# Patient Record
Sex: MALE | Race: WHITE | Employment: FULL TIME | URBAN - METROPOLITAN AREA
[De-identification: names, ages, dates, MRNs, and addresses within clinical notes are randomized per-mention and may not be internally consistent; named-entity substitution may affect disease eponyms.]

---

## 2022-11-11 ENCOUNTER — APPOINTMENT (OUTPATIENT)
Dept: GENERAL RADIOLOGY | Age: 67
DRG: 201 | End: 2022-11-11
Payer: COMMERCIAL

## 2022-11-11 ENCOUNTER — HOSPITAL ENCOUNTER (INPATIENT)
Age: 67
LOS: 1 days | Discharge: ANOTHER HOSPITAL | DRG: 201 | End: 2022-11-11
Attending: EMERGENCY MEDICINE | Admitting: INTERNAL MEDICINE
Payer: COMMERCIAL

## 2022-11-11 ENCOUNTER — APPOINTMENT (OUTPATIENT)
Dept: CT IMAGING | Age: 67
DRG: 201 | End: 2022-11-11
Payer: COMMERCIAL

## 2022-11-11 ENCOUNTER — HOSPITAL ENCOUNTER (INPATIENT)
Age: 67
LOS: 6 days | Discharge: HOME | DRG: 179 | End: 2022-11-17
Attending: INTERNAL MEDICINE | Admitting: INTERNAL MEDICINE
Payer: COMMERCIAL

## 2022-11-11 VITALS
BODY MASS INDEX: 25.2 KG/M2 | HEIGHT: 71 IN | SYSTOLIC BLOOD PRESSURE: 125 MMHG | WEIGHT: 180 LBS | HEART RATE: 72 BPM | DIASTOLIC BLOOD PRESSURE: 86 MMHG | OXYGEN SATURATION: 98 % | RESPIRATION RATE: 18 BRPM | TEMPERATURE: 98.4 F

## 2022-11-11 DIAGNOSIS — R42 DIZZINESS: ICD-10-CM

## 2022-11-11 DIAGNOSIS — I47.20 VENTRICULAR TACHYCARDIA: Primary | ICD-10-CM

## 2022-11-11 PROBLEM — R74.01 ELEVATED TRANSAMINASE LEVEL: Status: ACTIVE | Noted: 2022-11-11

## 2022-11-11 PROBLEM — I25.10 CAD IN NATIVE ARTERY: Status: ACTIVE | Noted: 2022-11-11

## 2022-11-11 PROBLEM — I42.8 NONISCHEMIC CARDIOMYOPATHY (HCC): Status: ACTIVE | Noted: 2022-11-11

## 2022-11-11 LAB
ABO/RH: NORMAL
ALBUMIN SERPL-MCNC: 3.7 G/DL (ref 3.5–5.2)
ALBUMIN SERPL-MCNC: 3.8 G/DL (ref 3.5–5.2)
ALP BLD-CCNC: 103 U/L (ref 40–129)
ALP BLD-CCNC: 95 U/L (ref 40–129)
ALT SERPL-CCNC: 330 U/L (ref 0–40)
ALT SERPL-CCNC: 352 U/L (ref 0–40)
ANION GAP SERPL CALCULATED.3IONS-SCNC: 11 MMOL/L (ref 7–16)
ANION GAP SERPL CALCULATED.3IONS-SCNC: 13 MMOL/L (ref 7–16)
ANTIBODY SCREEN: NORMAL
AST SERPL-CCNC: 309 U/L (ref 0–39)
AST SERPL-CCNC: 350 U/L (ref 0–39)
BILIRUB SERPL-MCNC: 0.4 MG/DL (ref 0–1.2)
BILIRUB SERPL-MCNC: 0.6 MG/DL (ref 0–1.2)
BUN BLDV-MCNC: 24 MG/DL (ref 6–23)
BUN BLDV-MCNC: 25 MG/DL (ref 6–23)
CALCIUM SERPL-MCNC: 8.2 MG/DL (ref 8.6–10.2)
CALCIUM SERPL-MCNC: 8.5 MG/DL (ref 8.6–10.2)
CHLORIDE BLD-SCNC: 103 MMOL/L (ref 98–107)
CHLORIDE BLD-SCNC: 107 MMOL/L (ref 98–107)
CO2: 22 MMOL/L (ref 22–29)
CO2: 22 MMOL/L (ref 22–29)
CREAT SERPL-MCNC: 0.9 MG/DL (ref 0.7–1.2)
CREAT SERPL-MCNC: 1.1 MG/DL (ref 0.7–1.2)
EKG ATRIAL RATE: 85 BPM
EKG P AXIS: 67 DEGREES
EKG P-R INTERVAL: 136 MS
EKG Q-T INTERVAL: 380 MS
EKG QRS DURATION: 88 MS
EKG QTC CALCULATION (BAZETT): 452 MS
EKG R AXIS: 5 DEGREES
EKG T AXIS: 64 DEGREES
EKG VENTRICULAR RATE: 85 BPM
GFR SERPL CREATININE-BSD FRML MDRD: >60 ML/MIN/1.73
GFR SERPL CREATININE-BSD FRML MDRD: >60 ML/MIN/1.73
GLUCOSE BLD-MCNC: 175 MG/DL (ref 74–99)
GLUCOSE BLD-MCNC: 234 MG/DL (ref 74–99)
HBA1C MFR BLD: 5.7 % (ref 4–5.6)
HCT VFR BLD CALC: 44.8 % (ref 37–54)
HEMOGLOBIN: 15 G/DL (ref 12.5–16.5)
INR BLD: 1.1
LACTIC ACID: 1.9 MMOL/L (ref 0.5–2.2)
LACTIC ACID: 2.7 MMOL/L (ref 0.5–2.2)
LACTIC ACID: 3 MMOL/L (ref 0.5–2.2)
LIPASE: 39 U/L (ref 13–60)
MAGNESIUM: 2 MG/DL (ref 1.6–2.6)
MAGNESIUM: 2 MG/DL (ref 1.6–2.6)
MCH RBC QN AUTO: 29.2 PG (ref 26–35)
MCHC RBC AUTO-ENTMCNC: 33.5 % (ref 32–34.5)
MCV RBC AUTO: 87.3 FL (ref 80–99.9)
PDW BLD-RTO: 13.2 FL (ref 11.5–15)
PLATELET # BLD: 142 E9/L (ref 130–450)
PMV BLD AUTO: 12.3 FL (ref 7–12)
POTASSIUM SERPL-SCNC: 3.2 MMOL/L (ref 3.5–5)
POTASSIUM SERPL-SCNC: 3.6 MMOL/L (ref 3.5–5)
POTASSIUM SERPL-SCNC: 3.7 MMOL/L (ref 3.5–5)
PROTHROMBIN TIME: 12.2 SEC (ref 9.3–12.4)
RBC # BLD: 5.13 E12/L (ref 3.8–5.8)
SODIUM BLD-SCNC: 138 MMOL/L (ref 132–146)
SODIUM BLD-SCNC: 140 MMOL/L (ref 132–146)
T4 FREE: 1.17 NG/DL (ref 0.93–1.7)
TOTAL PROTEIN: 5.7 G/DL (ref 6.4–8.3)
TOTAL PROTEIN: 6.2 G/DL (ref 6.4–8.3)
TROPONIN, HIGH SENSITIVITY: 88 NG/L (ref 0–11)
TROPONIN, HIGH SENSITIVITY: 93 NG/L (ref 0–11)
TROPONIN, HIGH SENSITIVITY: 99 NG/L (ref 0–11)
TSH SERPL DL<=0.05 MIU/L-ACNC: 1.26 UIU/ML (ref 0.27–4.2)
WBC # BLD: 6.9 E9/L (ref 4.5–11.5)

## 2022-11-11 PROCEDURE — 93458 L HRT ARTERY/VENTRICLE ANGIO: CPT | Performed by: INTERNAL MEDICINE

## 2022-11-11 PROCEDURE — 93010 ELECTROCARDIOGRAM REPORT: CPT | Performed by: INTERNAL MEDICINE

## 2022-11-11 PROCEDURE — 99223 1ST HOSP IP/OBS HIGH 75: CPT | Performed by: INTERNAL MEDICINE

## 2022-11-11 PROCEDURE — 85027 COMPLETE CBC AUTOMATED: CPT

## 2022-11-11 PROCEDURE — 36415 COLL VENOUS BLD VENIPUNCTURE: CPT

## 2022-11-11 PROCEDURE — 85610 PROTHROMBIN TIME: CPT

## 2022-11-11 PROCEDURE — 6360000002 HC RX W HCPCS

## 2022-11-11 PROCEDURE — APPSS60 APP SPLIT SHARED TIME 46-60 MINUTES: Performed by: CLINICAL NURSE SPECIALIST

## 2022-11-11 PROCEDURE — 84484 ASSAY OF TROPONIN QUANT: CPT

## 2022-11-11 PROCEDURE — 2580000003 HC RX 258

## 2022-11-11 PROCEDURE — 96365 THER/PROPH/DIAG IV INF INIT: CPT

## 2022-11-11 PROCEDURE — C1769 GUIDE WIRE: HCPCS

## 2022-11-11 PROCEDURE — 93005 ELECTROCARDIOGRAM TRACING: CPT | Performed by: INTERNAL MEDICINE

## 2022-11-11 PROCEDURE — 1200000000 HC SEMI PRIVATE

## 2022-11-11 PROCEDURE — 6360000002 HC RX W HCPCS: Performed by: EMERGENCY MEDICINE

## 2022-11-11 PROCEDURE — 83735 ASSAY OF MAGNESIUM: CPT

## 2022-11-11 PROCEDURE — 93458 L HRT ARTERY/VENTRICLE ANGIO: CPT

## 2022-11-11 PROCEDURE — 84443 ASSAY THYROID STIM HORMONE: CPT

## 2022-11-11 PROCEDURE — 93005 ELECTROCARDIOGRAM TRACING: CPT | Performed by: STUDENT IN AN ORGANIZED HEALTH CARE EDUCATION/TRAINING PROGRAM

## 2022-11-11 PROCEDURE — 84439 ASSAY OF FREE THYROXINE: CPT

## 2022-11-11 PROCEDURE — 80074 ACUTE HEPATITIS PANEL: CPT

## 2022-11-11 PROCEDURE — 86901 BLOOD TYPING SEROLOGIC RH(D): CPT

## 2022-11-11 PROCEDURE — 2140000000 HC CCU INTERMEDIATE R&B

## 2022-11-11 PROCEDURE — 83690 ASSAY OF LIPASE: CPT

## 2022-11-11 PROCEDURE — 6370000000 HC RX 637 (ALT 250 FOR IP): Performed by: INTERNAL MEDICINE

## 2022-11-11 PROCEDURE — 99254 IP/OBS CNSLTJ NEW/EST MOD 60: CPT | Performed by: INTERNAL MEDICINE

## 2022-11-11 PROCEDURE — 71045 X-RAY EXAM CHEST 1 VIEW: CPT

## 2022-11-11 PROCEDURE — 6360000002 HC RX W HCPCS: Performed by: NURSE PRACTITIONER

## 2022-11-11 PROCEDURE — 84132 ASSAY OF SERUM POTASSIUM: CPT

## 2022-11-11 PROCEDURE — 86850 RBC ANTIBODY SCREEN: CPT

## 2022-11-11 PROCEDURE — 6360000002 HC RX W HCPCS: Performed by: STUDENT IN AN ORGANIZED HEALTH CARE EDUCATION/TRAINING PROGRAM

## 2022-11-11 PROCEDURE — C1894 INTRO/SHEATH, NON-LASER: HCPCS

## 2022-11-11 PROCEDURE — 83036 HEMOGLOBIN GLYCOSYLATED A1C: CPT

## 2022-11-11 PROCEDURE — 83605 ASSAY OF LACTIC ACID: CPT

## 2022-11-11 PROCEDURE — 2500000003 HC RX 250 WO HCPCS

## 2022-11-11 PROCEDURE — B2111ZZ FLUOROSCOPY OF MULTIPLE CORONARY ARTERIES USING LOW OSMOLAR CONTRAST: ICD-10-PCS | Performed by: INTERNAL MEDICINE

## 2022-11-11 PROCEDURE — 2709999900 HC NON-CHARGEABLE SUPPLY

## 2022-11-11 PROCEDURE — B2151ZZ FLUOROSCOPY OF LEFT HEART USING LOW OSMOLAR CONTRAST: ICD-10-PCS | Performed by: INTERNAL MEDICINE

## 2022-11-11 PROCEDURE — 6360000002 HC RX W HCPCS: Performed by: INTERNAL MEDICINE

## 2022-11-11 PROCEDURE — 99285 EMERGENCY DEPT VISIT HI MDM: CPT

## 2022-11-11 PROCEDURE — 4A023N7 MEASUREMENT OF CARDIAC SAMPLING AND PRESSURE, LEFT HEART, PERCUTANEOUS APPROACH: ICD-10-PCS | Performed by: INTERNAL MEDICINE

## 2022-11-11 PROCEDURE — 70450 CT HEAD/BRAIN W/O DYE: CPT

## 2022-11-11 PROCEDURE — 80053 COMPREHEN METABOLIC PANEL: CPT

## 2022-11-11 PROCEDURE — 86900 BLOOD TYPING SEROLOGIC ABO: CPT

## 2022-11-11 PROCEDURE — 96368 THER/DIAG CONCURRENT INF: CPT

## 2022-11-11 PROCEDURE — 2580000003 HC RX 258: Performed by: INTERNAL MEDICINE

## 2022-11-11 PROCEDURE — 6370000000 HC RX 637 (ALT 250 FOR IP): Performed by: CLINICAL NURSE SPECIALIST

## 2022-11-11 RX ORDER — SODIUM CHLORIDE 0.9 % (FLUSH) 0.9 %
5-40 SYRINGE (ML) INJECTION EVERY 12 HOURS SCHEDULED
OUTPATIENT
Start: 2022-11-11

## 2022-11-11 RX ORDER — MAGNESIUM SULFATE IN WATER 40 MG/ML
2000 INJECTION, SOLUTION INTRAVENOUS ONCE
Status: COMPLETED | OUTPATIENT
Start: 2022-11-11 | End: 2022-11-11

## 2022-11-11 RX ORDER — SODIUM CHLORIDE 0.9 % (FLUSH) 0.9 %
5-40 SYRINGE (ML) INJECTION PRN
Status: DISCONTINUED | OUTPATIENT
Start: 2022-11-11 | End: 2022-11-11 | Stop reason: HOSPADM

## 2022-11-11 RX ORDER — LIDOCAINE HYDROCHLORIDE ANHYDROUS AND DEXTROSE MONOHYDRATE .4; 5 G/100ML; G/100ML
2 INJECTION, SOLUTION INTRAVENOUS CONTINUOUS
Status: DISCONTINUED | OUTPATIENT
Start: 2022-11-11 | End: 2022-11-11 | Stop reason: HOSPADM

## 2022-11-11 RX ORDER — SODIUM CHLORIDE 0.9 % (FLUSH) 0.9 %
5-40 SYRINGE (ML) INJECTION PRN
Status: DISCONTINUED | OUTPATIENT
Start: 2022-11-11 | End: 2022-11-17 | Stop reason: HOSPADM

## 2022-11-11 RX ORDER — ACETAMINOPHEN 650 MG/1
650 SUPPOSITORY RECTAL EVERY 6 HOURS PRN
OUTPATIENT
Start: 2022-11-11

## 2022-11-11 RX ORDER — METOPROLOL SUCCINATE 25 MG/1
25 TABLET, EXTENDED RELEASE ORAL 2 TIMES DAILY
Status: DISCONTINUED | OUTPATIENT
Start: 2022-11-11 | End: 2022-11-11 | Stop reason: HOSPADM

## 2022-11-11 RX ORDER — ACETAMINOPHEN 325 MG/1
650 TABLET ORAL EVERY 4 HOURS PRN
Status: DISCONTINUED | OUTPATIENT
Start: 2022-11-11 | End: 2022-11-15 | Stop reason: SDUPTHER

## 2022-11-11 RX ORDER — NITROGLYCERIN 20 MG/100ML
5-200 INJECTION INTRAVENOUS CONTINUOUS
Status: DISCONTINUED | OUTPATIENT
Start: 2022-11-11 | End: 2022-11-11 | Stop reason: HOSPADM

## 2022-11-11 RX ORDER — LIDOCAINE HYDROCHLORIDE ANHYDROUS AND DEXTROSE MONOHYDRATE .4; 5 G/100ML; G/100ML
1 INJECTION, SOLUTION INTRAVENOUS CONTINUOUS
Status: DISPENSED | OUTPATIENT
Start: 2022-11-11 | End: 2022-11-13

## 2022-11-11 RX ORDER — SODIUM CHLORIDE 9 MG/ML
INJECTION, SOLUTION INTRAVENOUS PRN
Status: DISCONTINUED | OUTPATIENT
Start: 2022-11-11 | End: 2022-11-17 | Stop reason: HOSPADM

## 2022-11-11 RX ORDER — ENOXAPARIN SODIUM 100 MG/ML
40 INJECTION SUBCUTANEOUS DAILY
Status: DISCONTINUED | OUTPATIENT
Start: 2022-11-11 | End: 2022-11-11 | Stop reason: HOSPADM

## 2022-11-11 RX ORDER — ASPIRIN 81 MG/1
81 TABLET, CHEWABLE ORAL DAILY
Status: DISCONTINUED | OUTPATIENT
Start: 2022-11-12 | End: 2022-11-17 | Stop reason: HOSPADM

## 2022-11-11 RX ORDER — POLYETHYLENE GLYCOL 3350 17 G/17G
17 POWDER, FOR SOLUTION ORAL DAILY PRN
Status: DISCONTINUED | OUTPATIENT
Start: 2022-11-11 | End: 2022-11-11 | Stop reason: HOSPADM

## 2022-11-11 RX ORDER — POLYETHYLENE GLYCOL 3350 17 G/17G
17 POWDER, FOR SOLUTION ORAL DAILY PRN
OUTPATIENT
Start: 2022-11-11

## 2022-11-11 RX ORDER — ATORVASTATIN CALCIUM 40 MG/1
40 TABLET, FILM COATED ORAL NIGHTLY
Status: DISCONTINUED | OUTPATIENT
Start: 2022-11-11 | End: 2022-11-11 | Stop reason: HOSPADM

## 2022-11-11 RX ORDER — SODIUM CHLORIDE 0.9 % (FLUSH) 0.9 %
5-40 SYRINGE (ML) INJECTION EVERY 12 HOURS SCHEDULED
Status: DISCONTINUED | OUTPATIENT
Start: 2022-11-11 | End: 2022-11-11 | Stop reason: HOSPADM

## 2022-11-11 RX ORDER — ENOXAPARIN SODIUM 100 MG/ML
40 INJECTION SUBCUTANEOUS DAILY
Status: DISCONTINUED | OUTPATIENT
Start: 2022-11-12 | End: 2022-11-14

## 2022-11-11 RX ORDER — NITROGLYCERIN 20 MG/100ML
5-200 INJECTION INTRAVENOUS CONTINUOUS
OUTPATIENT
Start: 2022-11-11

## 2022-11-11 RX ORDER — ONDANSETRON 2 MG/ML
4 INJECTION INTRAMUSCULAR; INTRAVENOUS EVERY 6 HOURS PRN
OUTPATIENT
Start: 2022-11-11

## 2022-11-11 RX ORDER — ACETAMINOPHEN 325 MG/1
650 TABLET ORAL EVERY 6 HOURS PRN
Status: DISCONTINUED | OUTPATIENT
Start: 2022-11-11 | End: 2022-11-11 | Stop reason: HOSPADM

## 2022-11-11 RX ORDER — ASPIRIN 81 MG/1
81 TABLET, CHEWABLE ORAL DAILY
Qty: 30 TABLET | Refills: 3
Start: 2022-11-12

## 2022-11-11 RX ORDER — METOPROLOL SUCCINATE 25 MG/1
25 TABLET, EXTENDED RELEASE ORAL 2 TIMES DAILY
Status: DISCONTINUED | OUTPATIENT
Start: 2022-11-11 | End: 2022-11-17 | Stop reason: HOSPADM

## 2022-11-11 RX ORDER — SODIUM CHLORIDE 0.9 % (FLUSH) 0.9 %
5-40 SYRINGE (ML) INJECTION EVERY 12 HOURS SCHEDULED
Status: DISCONTINUED | OUTPATIENT
Start: 2022-11-11 | End: 2022-11-17 | Stop reason: HOSPADM

## 2022-11-11 RX ORDER — ASPIRIN 81 MG/1
243 TABLET, CHEWABLE ORAL ONCE
Status: COMPLETED | OUTPATIENT
Start: 2022-11-11 | End: 2022-11-11

## 2022-11-11 RX ORDER — SODIUM CHLORIDE 0.9 % (FLUSH) 0.9 %
5-40 SYRINGE (ML) INJECTION PRN
OUTPATIENT
Start: 2022-11-11

## 2022-11-11 RX ORDER — ONDANSETRON 4 MG/1
4 TABLET, ORALLY DISINTEGRATING ORAL EVERY 8 HOURS PRN
Status: DISCONTINUED | OUTPATIENT
Start: 2022-11-11 | End: 2022-11-11 | Stop reason: HOSPADM

## 2022-11-11 RX ORDER — ACETAMINOPHEN 325 MG/1
650 TABLET ORAL EVERY 6 HOURS PRN
OUTPATIENT
Start: 2022-11-11

## 2022-11-11 RX ORDER — SODIUM CHLORIDE 9 MG/ML
INJECTION, SOLUTION INTRAVENOUS PRN
OUTPATIENT
Start: 2022-11-11

## 2022-11-11 RX ORDER — POTASSIUM CHLORIDE 20 MEQ/1
40 TABLET, EXTENDED RELEASE ORAL 2 TIMES DAILY WITH MEALS
Status: DISCONTINUED | OUTPATIENT
Start: 2022-11-11 | End: 2022-11-11 | Stop reason: HOSPADM

## 2022-11-11 RX ORDER — LIDOCAINE HYDROCHLORIDE ANHYDROUS AND DEXTROSE MONOHYDRATE .4; 5 G/100ML; G/100ML
2 INJECTION, SOLUTION INTRAVENOUS CONTINUOUS
OUTPATIENT
Start: 2022-11-11

## 2022-11-11 RX ORDER — ACETAMINOPHEN 650 MG/1
650 SUPPOSITORY RECTAL EVERY 6 HOURS PRN
Status: DISCONTINUED | OUTPATIENT
Start: 2022-11-11 | End: 2022-11-11 | Stop reason: HOSPADM

## 2022-11-11 RX ORDER — METOPROLOL SUCCINATE 25 MG/1
25 TABLET, EXTENDED RELEASE ORAL 2 TIMES DAILY
OUTPATIENT
Start: 2022-11-11

## 2022-11-11 RX ORDER — ASPIRIN 81 MG/1
81 TABLET, CHEWABLE ORAL DAILY
OUTPATIENT
Start: 2022-11-12

## 2022-11-11 RX ORDER — SODIUM CHLORIDE 9 MG/ML
INJECTION, SOLUTION INTRAVENOUS PRN
Status: DISCONTINUED | OUTPATIENT
Start: 2022-11-11 | End: 2022-11-11 | Stop reason: HOSPADM

## 2022-11-11 RX ORDER — METOPROLOL SUCCINATE 25 MG/1
25 TABLET, EXTENDED RELEASE ORAL 2 TIMES DAILY
Qty: 30 TABLET | Refills: 3 | Status: ON HOLD
Start: 2022-11-11 | End: 2022-11-17 | Stop reason: SDUPTHER

## 2022-11-11 RX ORDER — POTASSIUM CHLORIDE 20 MEQ/1
40 TABLET, EXTENDED RELEASE ORAL ONCE
Status: DISCONTINUED | OUTPATIENT
Start: 2022-11-11 | End: 2022-11-11

## 2022-11-11 RX ORDER — ONDANSETRON 2 MG/ML
4 INJECTION INTRAMUSCULAR; INTRAVENOUS EVERY 6 HOURS PRN
Status: DISCONTINUED | OUTPATIENT
Start: 2022-11-11 | End: 2022-11-11 | Stop reason: HOSPADM

## 2022-11-11 RX ORDER — ONDANSETRON 4 MG/1
4 TABLET, ORALLY DISINTEGRATING ORAL EVERY 8 HOURS PRN
OUTPATIENT
Start: 2022-11-11

## 2022-11-11 RX ORDER — POTASSIUM CHLORIDE 20 MEQ/1
40 TABLET, EXTENDED RELEASE ORAL 2 TIMES DAILY WITH MEALS
OUTPATIENT
Start: 2022-11-11

## 2022-11-11 RX ORDER — ASPIRIN 81 MG/1
81 TABLET, CHEWABLE ORAL DAILY
Status: DISCONTINUED | OUTPATIENT
Start: 2022-11-11 | End: 2022-11-11 | Stop reason: HOSPADM

## 2022-11-11 RX ADMIN — LIDOCAINE HYDROCHLORIDE 2 MG/MIN: 4 INJECTION, SOLUTION INTRAVENOUS at 22:32

## 2022-11-11 RX ADMIN — MAGNESIUM SULFATE HEPTAHYDRATE 2000 MG: 40 INJECTION, SOLUTION INTRAVENOUS at 05:07

## 2022-11-11 RX ADMIN — LIDOCAINE HYDROCHLORIDE 2 MG/MIN: 4 INJECTION, SOLUTION INTRAVENOUS at 04:48

## 2022-11-11 RX ADMIN — ASPIRIN 81 MG CHEWABLE TABLET 81 MG: 81 TABLET CHEWABLE at 08:29

## 2022-11-11 RX ADMIN — METOPROLOL SUCCINATE 25 MG: 25 TABLET, FILM COATED, EXTENDED RELEASE ORAL at 22:30

## 2022-11-11 RX ADMIN — POTASSIUM CHLORIDE 40 MEQ: 1500 TABLET, EXTENDED RELEASE ORAL at 08:29

## 2022-11-11 RX ADMIN — SODIUM CHLORIDE, PRESERVATIVE FREE 10 ML: 5 INJECTION INTRAVENOUS at 22:30

## 2022-11-11 RX ADMIN — ENOXAPARIN SODIUM 40 MG: 100 INJECTION SUBCUTANEOUS at 10:36

## 2022-11-11 RX ADMIN — ASPIRIN 243 MG: 81 TABLET, CHEWABLE ORAL at 13:27

## 2022-11-11 RX ADMIN — SACUBITRIL AND VALSARTAN 1 TABLET: 24; 26 TABLET, FILM COATED ORAL at 22:30

## 2022-11-11 ASSESSMENT — ENCOUNTER SYMPTOMS
SHORTNESS OF BREATH: 0
VOMITING: 0
EYE REDNESS: 0
NAUSEA: 1
ABDOMINAL PAIN: 0

## 2022-11-11 ASSESSMENT — PAIN SCALES - GENERAL
PAINLEVEL_OUTOF10: 0
PAINLEVEL_OUTOF10: 2

## 2022-11-11 ASSESSMENT — PAIN - FUNCTIONAL ASSESSMENT
PAIN_FUNCTIONAL_ASSESSMENT: NONE - DENIES PAIN
PAIN_FUNCTIONAL_ASSESSMENT: NONE - DENIES PAIN
PAIN_FUNCTIONAL_ASSESSMENT: 0-10
PAIN_FUNCTIONAL_ASSESSMENT: NONE - DENIES PAIN
PAIN_FUNCTIONAL_ASSESSMENT: NONE - DENIES PAIN

## 2022-11-11 ASSESSMENT — PAIN DESCRIPTION - LOCATION: LOCATION: ABDOMEN

## 2022-11-11 ASSESSMENT — PAIN DESCRIPTION - ORIENTATION: ORIENTATION: MID;UPPER

## 2022-11-11 NOTE — ED PROVIDER NOTES
Chief complaint: Dizziness and fatigue      HPI:  11/11/22, Time: 5:06 AM EST    SERENITY Alvarez is a 78 y.o. male presenting to the ED for dizziness. The history is obtained from the patient as well as patient's medical record. Immediately upon arrival the patient was noted to be in V. tach. The patient was treated and cardioverted out of V. tach and then for history is obtained secondary to the acuity of the patient's condition. The patient states that he is a  he is from [facility]. 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 Dizziness was moderate in severity. Described as lightheaded. Nothing made it better. No treatment prior to arrival.  The patient has no prior cardiac history. He did complain of a epigastric abdominal pain described as aching. Mild in severity. This was improved after the cardioversion and termination of the V. tach. The patient states that he does feel confused although he is oriented to person, place and time    ROS:   Review of Systems   Constitutional:  Negative for chills and fever. HENT:  Negative for congestion. Eyes:  Negative for redness. Respiratory:  Negative for shortness of breath. Cardiovascular:  Negative for chest pain. Gastrointestinal:  Positive for nausea. Negative for abdominal pain and vomiting. Genitourinary:  Negative for dysuria. Musculoskeletal:  Negative for arthralgias. Skin:  Negative for rash. Neurological:  Positive for dizziness and light-headedness. Psychiatric/Behavioral:  Positive for confusion. All other systems reviewed and are negative.    --------------------------------------------- PAST HISTORY ---------------------------------------------  Past Medical History:  has no past medical history on file. Past Surgical History:  has no past surgical history on file. Social History:  reports that he has quit smoking.  His smoking use included cigarettes. He does not have any smokeless tobacco history on file. Family History: family history is not on file. The patients home medications have been reviewed. Allergies: Patient has no known allergies. ---------------------------------------------------PHYSICAL EXAM--------------------------------------        Constitutional/General: Alert and oriented x3, well appearing, non toxic in NAD  Head: Normocephalic and atraumatic  Mouth: Oropharynx clear, handling secretions, no trismus  Neck: Supple, full ROM,  Pulmonary: Lungs clear to auscultation bilaterally, no wheezes, rales, or rhonchi. Not in respiratory distress  Cardiovascular: Tachycardic rate. Regular rhythm. No murmurs  Chest: no chest wall tenderness  Abdomen: Soft. Non tender. Non distended. No rebound, guarding, or rigidity. No pulsatile masses appreciated. Musculoskeletal: Moves all extremities x 4. Warm and well perfused, no clubbing, cyanosis, or edema. Capillary refill <3 seconds  Skin: warm and dry. No rashes. Neurologic: GCS 15, no gross focal neurologic deficits  Psych: Normal Affect    -------------------------------------------------- RESULTS -------------------------------------------------  I have personally reviewed all laboratory and imaging results for this patient. Results are listed below.      LABS:  Results for orders placed or performed during the hospital encounter of 11/11/22   CBC   Result Value Ref Range    WBC 6.9 4.5 - 11.5 E9/L    RBC 5.13 3.80 - 5.80 E12/L    Hemoglobin 15.0 12.5 - 16.5 g/dL    Hematocrit 44.8 37.0 - 54.0 %    MCV 87.3 80.0 - 99.9 fL    MCH 29.2 26.0 - 35.0 pg    MCHC 33.5 32.0 - 34.5 %    RDW 13.2 11.5 - 15.0 fL    Platelets 165 899 - 169 E9/L    MPV 12.3 (H) 7.0 - 12.0 fL   Comprehensive Metabolic Panel   Result Value Ref Range    Sodium 138 132 - 146 mmol/L    Potassium 3.7 3.5 - 5.0 mmol/L    Chloride 103 98 - 107 mmol/L    CO2 22 22 - 29 mmol/L    Anion Gap 13 7 - 16 mmol/L Glucose 234 (H) 74 - 99 mg/dL    BUN 25 (H) 6 - 23 mg/dL    Creatinine 1.1 0.7 - 1.2 mg/dL    Est, Glom Filt Rate >60 >=60 mL/min/1.73    Calcium 8.5 (L) 8.6 - 10.2 mg/dL    Total Protein 6.2 (L) 6.4 - 8.3 g/dL    Albumin 3.8 3.5 - 5.2 g/dL    Total Bilirubin 0.6 0.0 - 1.2 mg/dL    Alkaline Phosphatase 103 40 - 129 U/L     (H) 0 - 40 U/L     (H) 0 - 39 U/L   Troponin   Result Value Ref Range    Troponin, High Sensitivity 93 (H) 0 - 11 ng/L   Troponin   Result Value Ref Range    Troponin, High Sensitivity 88 (H) 0 - 11 ng/L   Magnesium   Result Value Ref Range    Magnesium 2.0 1.6 - 2.6 mg/dL   Lactic Acid   Result Value Ref Range    Lactic Acid 3.0 (H) 0.5 - 2.2 mmol/L   Lipase   Result Value Ref Range    Lipase 39 13 - 60 U/L   Protime-INR   Result Value Ref Range    Protime 12.2 9.3 - 12.4 sec    INR 1.1    Hemoglobin A1C   Result Value Ref Range    Hemoglobin A1C 5.7 (H) 4.0 - 5.6 %   Comprehensive Metabolic Panel   Result Value Ref Range    Sodium 140 132 - 146 mmol/L    Potassium 3.2 (L) 3.5 - 5.0 mmol/L    Chloride 107 98 - 107 mmol/L    CO2 22 22 - 29 mmol/L    Anion Gap 11 7 - 16 mmol/L    Glucose 175 (H) 74 - 99 mg/dL    BUN 24 (H) 6 - 23 mg/dL    Creatinine 0.9 0.7 - 1.2 mg/dL    Est, Glom Filt Rate >60 >=60 mL/min/1.73    Calcium 8.2 (L) 8.6 - 10.2 mg/dL    Total Protein 5.7 (L) 6.4 - 8.3 g/dL    Albumin 3.7 3.5 - 5.2 g/dL    Total Bilirubin 0.4 0.0 - 1.2 mg/dL    Alkaline Phosphatase 95 40 - 129 U/L     (H) 0 - 40 U/L     (H) 0 - 39 U/L   Lactic Acid   Result Value Ref Range    Lactic Acid 2.7 (H) 0.5 - 2.2 mmol/L   TSH   Result Value Ref Range    TSH 1.260 0.270 - 4.200 uIU/mL   T4, Free   Result Value Ref Range    T4 Free 1.17 0.93 - 1.70 ng/dL   Troponin   Result Value Ref Range    Troponin, High Sensitivity 99 (H) 0 - 11 ng/L   EKG 12 Lead   Result Value Ref Range    Ventricular Rate 85 BPM    Atrial Rate 85 BPM    P-R Interval 136 ms    QRS Duration 88 ms    Q-T Interval 380 ms    QTc Calculation (Bazett) 452 ms    P Axis 67 degrees    R Axis 5 degrees    T Axis 64 degrees       RADIOLOGY:  Interpreted by Radiologist.  CT HEAD WO CONTRAST   Final Result   No acute intracranial abnormality. RECOMMENDATIONS:   Careful clinical correlation and follow up recommended. XR CHEST PORTABLE   Final Result   No acute disease. RECOMMENDATION:   Careful clinical correlation and follow up recommended. EKG:-Performed after cardioversion  This EKG is signed and interpreted by me. Normal sinus rhythm rate of 85, no ST segment elevation or depression, WA interval 136 MS, QRS 88 MS,  ms  Interpreted by me    Cardioversion Procedure Note    Indication: ventricular tachycardia    Consent: Unable to be obtained due to the emergent nature of this procedure. Pre-Medication: none    Procedure: The patient was placed in the supine position and the chest area was exposed. The cardioversion pads were applied in the standard manner and configuration. Attempt #1: The defibrillator was set on the synchronous mode and charged to 100 joules. A charge was then delivered which resulted in conversion to normal sinus rhythm. Attempt #2: Not necessary    Attempt #3: Not necessary    The patient tolerated the procedure well. Complications: None        ------------------------- NURSING NOTES AND VITALS REVIEWED ---------------------------   The nursing notes within the ED encounter and vital signs as below have been reviewed by myself. /86   Pulse 72   Temp 98.4 °F (36.9 °C) (Oral)   Resp 18   Ht 5' 11\" (1.803 m)   Wt 180 lb (81.6 kg)   SpO2 98%   BMI 25.10 kg/m²   Oxygen Saturation Interpretation: Normal    The patients available past medical records and past encounters were reviewed.         ------------------------------ ED COURSE/MEDICAL DECISION MAKING----------------------  Medications   magnesium sulfate 2000 mg in 50 mL IVPB premix (0 mg IntraVENous Stopped 11/11/22 0550)             Medical Decision Making:   I, Dr. Ana Knutson am the primary physician of record. Duglas Dugan is a 78 y.o. male who presents to the ED for dizziness and lightheaded. The patient did arrive complaint dizziness and lightheadedness. Immediately upon arrival on rhythm strip patient was noted to be in V. tach. The patient was mentating did have a systolic blood pressure 90. He was immediately given 100 mg of lidocaine without immediate success and was feeling very lightheaded and near syncopal so was cardioverted with 100 J with success. He was initiated on a lidocaine drip also given magnesium. Labs reviewed. Patient will be admitted. Cardiology consultation      Re-Evaluations/Consultations:             Patient is in the bed no acute distress. Results of today discussed. He is agreeable to admission. Spoke with Dr. Kamala Michaud he will accept the patient for admission              This patient's ED course included: History, physical examination, reevaluation prior to disposition, labs, imaging, telemetry monitoring, EKG, IV medications      This patient has remained hemodynamically stable during their ED course. Critical care:  Critical Care: Please note that the withdrawal or failure to initiate urgent interventions for this patient would likely result in a life threatening deterioration or permanent disability. Accordingly this patient received 32 minutes of critical care time, excluding separately billable procedures. Counseling: The emergency provider has spoken with the patient and discussed todays results, in addition to providing specific details for the plan of care and counseling regarding the diagnosis and prognosis. Questions are answered at this time and they are agreeable with the plan.       --------------------------------- IMPRESSION AND DISPOSITION ---------------------------------    IMPRESSION  1. Ventricular tachycardia    2. Dizziness        DISPOSITION  Disposition: Admit to [address]  Patient condition is serious        NOTE: This report was transcribed using voice recognition software. Every effort was made to ensure accuracy; however, inadvertent computerized transcription errors may be present       ATTENDING PROVIDER ATTESTATION:     I,  Dr. Cliff Landaverde am the primary physician of record for this patient. Zan Segundo presented to the emergency department for evaluation of Abdominal Pain (Picked up from EMS at the  gas station pt stated ate [employer] last night and went to sleep now woke up has severe pain in center part of abdomen. No cardiac hx pt -176 per EMS)   and was initially evaluated by the Medical Resident. See Original ED Note for H&P and ED course above. I have reviewed and discussed the case, including pertinent history (medical, surgical, family and social) and exam findings with the Medical Resident assigned to Zan Sanya. I have personally performed and/or participated in the history, exam, medical decision making, and procedures and agree with all pertinent clinical information. If an EKG was performed I did review the EKG and did discuss the interpretation with the resident. I do agree with the residents interpretation. I was present and supervised all critical parts of the procedure. I have reviewed my findings and recommendations with the assigned Medical Resident, Zan Segundo and members of family present at the time of disposition. My findings/plan: The primary encounter diagnosis was Ventricular tachycardia. A diagnosis of Dizziness was also pertinent to this visit.   Discharge Medication List as of 11/11/2022 12:54 PM        START taking these medications    Details   aspirin 81 MG chewable tablet Take 1 tablet by mouth daily, Disp-30 tablet, R-3NO PRINT      metoprolol succinate (TOPROL XL) 25 MG extended release tablet Take 1 tablet by mouth 2 times daily, Disp-30 tablet, [address] [facility] Joe U[address]uan [address]., DO  11/11/22 7768

## 2022-11-11 NOTE — DISCHARGE SUMMARY
[facility] Physician Discharge Summary       No follow-up provider specified. Activity level: As tolerated     Dispo: [facility] cath lab      Condition on discharge: Stable     Patient ID:  Jaspal Pan  [id number]  78 y.o.  1955    Admit date: 11/11/2022    Discharge date and time:  11/11/2022  12:50 PM    Admission Diagnoses: Principal Problem:    Ventricular tachycardia  Resolved Problems:    * No resolved hospital problems. *      Discharge Diagnoses: Principal Problem:    Ventricular tachycardia  Resolved Problems:    * No resolved hospital problems. *      Consults:  IP CONSULT TO CARDIOLOGY  IP CONSULT TO SOCIAL WORK  IP CONSULT TO [address] [place] [address]  Course:   Patient Jaspal Pan is a 78 y.o. presented with Ventricular tachycardia [I47.20]    Mr Marques Carreon is a 78year old man without chronic medical problems. He is employed as a long-, resides in [address] [place] area. He was in this area at truck stop, awoke feeling very dizzy and lightheaded. Called EMS and brought here to [facility] ED. He was in [facility] on telemetry. On arrival to ED he was treated with synchronized cardioversion - shocked with 100 joules and converted to a normal sinus rhythm. He was placed on Lidocaine infusion. Admission requested and cardiology consulted. Troponin levels 93 -> 88 -> 99  Follow cardioversion EKG showed sinus rhythm without acute ST changes. He continued to have chest pressure, Was started on ASA, Metoprolol and Nitroglycerin gtt. He was hyperglycemic - but A1c noted 5.7%  Note LFTs elevated ALT/AST in 300s with normal T bili -> thus did not start statin at this time. Lipid panel pending. Given new v-tach and no history of coronary disease, plan to transfer to [facility] (1-) for further ischemia work-up. Transferred by EMS to [facility] (1-) cath lab      Discharge Exam:    Please see H&P from same date    No intake/output data recorded. No intake/output data recorded.       LABS:  Recent Labs 11/11/22 0449 11/11/22 0628    140   K 3.7 3.2*    107   CO2 22 22   BUN 25* 24*   CREATININE 1.1 0.9   GLUCOSE 234* 175*   CALCIUM 8.5* 8.2*       Recent Labs     11/11/22 0449   WBC 6.9   RBC 5.13   HGB 15.0   HCT 44.8   MCV 87.3   MCH 29.2   MCHC 33.5   RDW 13.2      MPV 12.3*       No results for input(s): POCGLU in the last 72 hours. Imaging:  CT HEAD WO CONTRAST    Result Date: 11/11/2022  EXAMINATION: CT OF THE HEAD WITHOUT CONTRAST  11/11/2022 6:06 am TECHNIQUE: CT of the head was performed without the administration of intravenous contrast. Automated exposure control, iterative reconstruction, and/or weight based adjustment of the mA/kV was utilized to reduce the radiation dose to as low as reasonably achievable. COMPARISON: None. HISTORY: ORDERING SYSTEM PROVIDED HISTORY: confusion TECHNOLOGIST PROVIDED HISTORY: Reason for exam:->confusion Has a \"code stroke\" or \"stroke alert\" been called? ->No Decision Support Exception - unselect if not a suspected or confirmed emergency medical condition->Emergency Medical Condition (MA) FINDINGS: BRAIN/VENTRICLES: There is no acute intracranial hemorrhage, mass effect or midline shift. No abnormal extra-axial fluid collection. The gray-white differentiation is maintained without evidence of an acute infarct. There is no evidence of hydrocephalus. ORBITS: The visualized portion of the orbits demonstrate no acute abnormality. SINUSES: The visualized paranasal sinuses and mastoid air cells demonstrate no acute abnormality. SOFT TISSUES/SKULL:  No acute abnormality of the visualized skull or soft tissues. No acute intracranial abnormality. RECOMMENDATIONS: Careful clinical correlation and follow up recommended. XR CHEST PORTABLE    Result Date: 11/11/2022  EXAMINATION: ONE XRAY VIEW OF THE CHEST 11/11/2022 5:16 am COMPARISON: None. HISTORY: ORDERING SYSTEM PROVIDED HISTORY: V. tach TECHNOLOGIST PROVIDED HISTORY: Reason for exam:->V. tach FINDINGS: Normal cardiomediastinal silhouette. Lungs clear. No pneumothorax or effusion. Osseous thorax intact. No acute disease. RECOMMENDATION: Careful clinical correlation and follow up recommended.        Patient Instructions:      Medication List        START taking these medications      aspirin 81 MG chewable tablet  Take 1 tablet by mouth daily  Start taking on: November 12, 2022     metoprolol succinate 25 MG extended release tablet  Commonly known as: TOPROL XL  Take 1 tablet by mouth 2 times daily               Where to Get Your Medications        Information about where to get these medications is not yet available    Ask your nurse or doctor about these medications  aspirin 81 MG chewable tablet  metoprolol succinate 25 MG extended release tablet           Note that more than 30 minutes was spent in preparing discharge papers, discussing discharge with patient, medication review, etc.    Signed:  Electronically signed by GARRET Marks CNP on 11/11/2022 at 12:50 PM

## 2022-11-11 NOTE — PROGRESS NOTES
@6026 PA notified pt to be transported to [address] [place] [address]  Per Dr. Simon Rodriguez  ETA 1 hour @8905

## 2022-11-11 NOTE — CONSULTS
Inpatient Cardiology Consultation      Reason for Consult: Ventricular tachycardia    Consulting Physician: Dr. Petra Syed     Requesting Physician:  Dr. Trini Martinez    Date of Consultation: 11/11/2022    History of Present Illness:    Mr. Ayse Mariscal is a 78year old gentleman who is previously unknown to our practice; he denies history of coronary disease, heart failure, or arrhythmia. He is from [facility] and is a long distance . He reports feeling weak and having decreased activity tolerance since spring of this year. Last night, he ate [employer] and after waking from a nap, he felt flushed, dizzy, had numbness in his extremities and his head, and had epigastric discomfort. He tried to shower, but was unable to stand long enough. His symptoms lasted about two hours before he called 911. He was found to be in VT and successfully cardioverted, in the ED, then placed on Lidocaine drip and also given magnesium sulfate. Troponin levels are 93>>88>>99 and LFTs are significantly elevated. He has been placed on Aspirin and Lipitor and echocardiogram has been ordered. Currently, he is resting in bed and in no apparent distress. He continues to feel weak but has had no further epigastric discomfort. He is tearful at times and reports financial concerns including food insecurity. He is currently in SR with no recurrent VT. He was taking no medications prior to admission. Past Medical History:    No past medical history on file. Past Surgical History:    No past surgical history on file.       Medications Prior to Admit:  Prior to Admission medications    Not on File       Current Medications:    Current Facility-Administered Medications: lidocaine 2000 mg in dextrose 5% 500 mL infusion, 2 mg/min, IntraVENous, Continuous  perflutren lipid microspheres (DEFINITY) injection 1.5 mL, 1.5 mL, IntraVENous, ONCE PRN  potassium chloride (KLOR-CON M) extended release tablet 40 mEq, 40 mEq, Oral, Once  atorvastatin (LIPITOR) tablet 40 mg, 40 mg, Oral, Nightly  aspirin chewable tablet 81 mg, 81 mg, Oral, Daily    Allergies:  Patient has no known allergies. Social History: There is no history of tobacco, alcohol, or illicit drug use. Family History:   He denies family history of coronary disease or sudden death. Review of Systems:   Constitutional: Denies fatigue, fevers, chills or night sweats  ENT: Denies headaches, bleeding gums or epistaxis   Cardiovascular: Denies chest pain,  palpitations, claudication pain, or lower extremity swelling. Epigastric pain as above. Respiratory: Denies dyspnea, orthopnea or PND. Positive for cough productive of yellow sputum. Gastrointestinal: Denies nausea, vomiting, abdominal pain or dark/bloody stools. Genitourinary: Denies urgency, dysuria or hematuria. Musculoskeletal: Denies gait disturbance, myalgias or arthralgias. Integumentary: Denies rash or ulcerations. Neurological: Denies syncope, Dizziness and numbness as above. Psychiatric: Denies anxiety or depression. Endocrine: Denies temperature intolerance or excessive thirst.   Hematologic/Lymphatic: Denies abnormal bruising or bleeding. Physical Exam:     /60   Pulse 76   Temp 97.3 °F (36.3 °C) (Oral)   Resp 18   Ht 5' 11\" (1.803 m)   Wt 180 lb (81.6 kg)   SpO2 99%   BMI 25.10 kg/m²   CONST:  Well developed, well nourished gentleman who appears of stated age. Awake, alert and cooperative. No apparent distress. HEENT:   Head- Normocephalic, atraumatic   Eyes- Conjunctivae pink  Throat- Oral mucosa pink and moist  Neck-  No stridor, jugular venous distention or carotid bruit. CHEST: Chest symmetrical and non-tender to palpation. Respirations unlabored. RESPIRATORY:  Breath sounds clear throughout   CARDIOVASCULAR:     Heart Ausculation- Regular rate and rhythm, no murmur. No s3, s4 or rub   PV: No lower extremity edema. No varicosities.  Pedal pulses palpable  ABDOMEN: Soft, non-tender to light palpation. Bowel sounds present. No palpable masses   MS: Good muscle strength and tone. No atrophy or abnormal movements. : Deferred  SKIN: Warm and dry   NEURO / PSYCH: Oriented to person, place and time. Speech clear and appropriate. Follows all commands. Pleasant affect     Telemetry: SR (VT strips not available for review, have been requested)      No intake or output data in the 24 hours ending 11/11/22 0757    Labs:   CBC:   Recent Labs     11/11/22 0449   WBC 6.9   HGB 15.0   HCT 44.8        BMP:   Recent Labs     11/11/22 0449 11/11/22 0628    140   K 3.7 3.2*   CO2 22 22   BUN 25* 24*   CREATININE 1.1 0.9   LABGLOM >60 >60   CALCIUM 8.5* 8.2*     Mag:   Recent Labs     11/11/22 0449   MG 2.0       TSH:   Recent Labs     11/11/22 0628   TSH 1.260       PT/INR:   Recent Labs     11/11/22 0449   PROTIME 12.2   INR 1.1       CARDIAC ENZYMES:  Recent Labs     11/11/22 0449 11/11/22  0549 11/11/22  0628   TROPHS 93* 88* 99*     LIVER PROFILE:  Recent Labs     11/11/22 0449 11/11/22  0628   * 309*   * 330*   LABALBU 3.8 3.7     CXR 11/11/2022:  Impression:      No acute disease. CT head without 11/11/2022:  Impression:      No acute intracranial abnormality. Assessment and Recommendations to follow by Dr. Rin Nguyen. Electronically signed by GARRET Saleh on 11/11/2022 at 7:57 AM      I independently interviewed and examined the patient. I have reviewed the above documentation completed by the DAKOTA. Please see my additional contributions to the HPI, physical exam, and assessment / medical decision making. I contributed more than 51% of patient care. Briefly, 60-year-old male not known to [facility]) who is a  and [place] [facility] to [place] arriving around [facility] last night. He report he has been having dyspnea on exertion and fatigue with activities for about a week or 2.   Then last night after eating [employer] he felt unwell with profound weakness, arm weakness epigastric and chest discomfort and then dizziness and confusion. He report these episodes were intermittent eventually called 911 and brought to the emergency room where he was found to be in VT requiring emergency cardioversion/shock. I talked to the patient this morning and he is on lidocaine and no recurrent VT but reports still having mild chest tightness and arm weakness. Troponin initially 99 then 88 and EKG around 4 AM shows high lateral ST depression and poor R wave progression and nonspecific ST-T wave changes. Liver enzymes are elevated but creatinine is fine. He has hyponatremia and this is replaced. Hemoglobin is stable. I discussed the case with ER attending and patient is to be transferred to [facility] for invasive coronary angiogram for further evaluation and to obtain EP consultation to guide therapy. I talked to the interventional cardiologist at [facility] Dr. Renée Friend who is has accepted the patient to do the procedure. Because patient is having chest pain we are going to initiate nitro drip. This was conveyed to patient. Review of Systems:  Cardiac: As per HPI  General: No fever, chills  Pulmonary: As per HPI  GI: No nausea, vomiting  Musculoskeletal: HOOVER x 4, no focal motor deficits  Skin: Intact, no rashes  Neuro/Psych: No headache or seizures    Physical Exam:  /83   Pulse 72   Temp 97.7 °F (36.5 °C) (Oral)   Resp 18   Ht 5' 11\" (1.803 m)   Wt 180 lb (81.6 kg)   SpO2 99%   BMI 25.10 kg/m²   Appearance: Awake, alert, no acute respiratory distress  Skin: Intact, no rash  Head: Normocephalic, atraumatic  ENMT: MMM, no rhinorrhea  Neck: Supple, no carotid bruits  Lungs: Clear to auscultation bilaterally. No wheezes, rales, or rhonchi.   Cardiac: Regular rate and rhythm, +S1S2, no murmurs apparent  Abdomen: Soft, +bowel sounds  Extremities: Moves all extremities x 4, no lower extremity edema  Neurologic: No focal motor deficits apparent, normal mood and affect        Assessment/Plan:   Ventricular tachycardia requiring cardioversion/shock  Patient is currently on lidocaine drip and blood pressure is stable  We do not have any information about the patient cardiac history but denies prior cardiac event  We have discussed the case with interventional cardiology and patient to be transferred to [facility] for invasive coronary angiogram for further evaluation.   Once patient arrived in [address] [place] [address] to be consulted for further evaluation and management  Obtain echocardiogram to guide therapy  Obtain urine drug screen and drug alcohol level   TSH and fasting lipid profile    Chest pressure with arm numbness  We will initiate nitro drip since he is still having chest pressure  Awaiting echocardiogram to guide therapy  Awaiting transfer to [facility] for invasive coronary angiogram      Hyponatremia  Please monitor electrolytes and keep potassium at 4 magnesium at 2      Elevated liver enzymes  Avoid liver toxic agents                                  Mary Shah MD  [facility] Cardiology

## 2022-11-11 NOTE — H&P
[facility] History and Physical        Chief Complaint:  lightheaded, +severe epigastric pain,  hand b/l numbess  History of Present Illness   The patient is a 78 y.o. male     from [place] [address] seen doc- since June - no hx of DM or liver etc.  NO hx of CAD  Inc BS, but not on DM2 meds  Inc liver noted also- no hx hep C etc... He has at truck stop sleeping, felt lightheaded - called EMS   Noted to have pulse but in Vtach-- lightheaded, +severe epigastric pain,  hand b/l numbess   +presyncope and confusion improved sp cardioversion in ER and termination of the V. tach. Back in sinus  Feels now 1/10 epigastric discomfort, +HA +hand numbess ? related to lidocaine?       - hx taken from the patient and records  REVIEW OF SYSTEMS:  no fevers, chills, cp, sob, n/v, ha, vision/hearing changes, wt changes, hot/cold flashes, other open skin lesions, diarrhea, constipation, dysuria/hematuria unless noted in HPI. Complete ROS performed with the patient and is otherwise negative. Past Medical History:  No past medical history on file. Past Surgical History:    No past surgical history on file. Home Medications:  Prior to Admission medications    Not on File       Allergies:  Patient has no allergy information on record. Social History:   TOBACCO:   has no history on file for tobacco use. ETOH:   has no history on file for alcohol use. Family History:   No family history on file. Or deferred/otherwise considered non contributory to current admission  PHYSICAL EXAM:    VS: /76   Pulse 87   Temp 97.3 °F (36.3 °C) (Oral)   Resp 16   Ht 5' 11\" (1.803 m)   Wt 180 lb (81.6 kg)   SpO2 94%   BMI 25.10 kg/m²     General Appearance:     no acute distress. Psych:  HEENT:    A.O.  As per HPI details  NC/AT, PERRL, no pallor no icterus, lips/ext mucous membrane grossly N    Neck:   Supple, trachea midline, no obvious JVD   Resp:     CTAB, No wheezes, No rhonchi Chest wall:    No tenderness or deformity   Heart:    Regular rate and rhythm, S1 and S2 normal, no rub or gallop. Abdomen:     Soft, non-tender, bowel sounds active    no suspicious obvious masses/organomegaly   Genitalia & Rectal:    Deferred.    Extremities x4:   Extremities normal, atraumatic, no cyanosis, no clubbing   Musculoskeletal:      NO active synovitis or swollen b/l wrists, 2-5 MCPs examined   Skin:   Skin color, texture, turgor fairly normal, no ACUTE rashes or lesions in lower legs and arms examined   Lymph nodes:   Cervical nodes grossly normal   Neurologic:  .Grossly symmetric  strength in UEs and LEs with symmetric grossly intact to light touch sensation     LABS:  CBC:   Lab Results   Component Value Date/Time    WBC 6.9 11/11/2022 04:49 AM    RBC 5.13 11/11/2022 04:49 AM    HGB 15.0 11/11/2022 04:49 AM    HCT 44.8 11/11/2022 04:49 AM     11/11/2022 04:49 AM    MCV 87.3 11/11/2022 04:49 AM     BMP:    Lab Results   Component Value Date/Time     11/11/2022 04:49 AM    K 3.7 11/11/2022 04:49 AM     11/11/2022 04:49 AM    CO2 22 11/11/2022 04:49 AM    BUN 25 11/11/2022 04:49 AM    CREATININE 1.1 11/11/2022 04:49 AM    GLUCOSE 234 11/11/2022 04:49 AM    CALCIUM 8.5 11/11/2022 04:49 AM     Hepatic Function Panel:    Lab Results   Component Value Date/Time    ALKPHOS 103 11/11/2022 04:49 AM     11/11/2022 04:49 AM     11/11/2022 04:49 AM    PROT 6.2 11/11/2022 04:49 AM    LABALBU 3.8 11/11/2022 04:49 AM    BILITOT 0.6 11/11/2022 04:49 AM     Magnesium:    Lab Results   Component Value Date/Time    MG 2.0 11/11/2022 04:49 AM       PT/INR:    Lab Results   Component Value Date/Time    PROTIME 12.2 11/11/2022 04:49 AM    INR 1.1 11/11/2022 04:49 AM     U/A: No results found for: NITRITE, LEUKOCYTESUR, PHUR, WBCUA, RBCUA, BACTERIA, SPECGRAV, BLOODU, GLUCOSEU  ABG:  No results found for: PHART, MFZ5VOZ, PO2ART, J3TCMBWZ, UDN1NIE, BEART  TSH:  No results found for: TSH  Cardiac Enzymes: No results found for: CKTOTAL, CKMB, CKMBINDEX, TROPONINI    Radiology: XR CHEST PORTABLE    Result Date: 11/11/2022  EXAMINATION: ONE XRAY VIEW OF THE CHEST 11/11/2022 5:16 am COMPARISON: None. HISTORY: ORDERING SYSTEM PROVIDED HISTORY: V. tach TECHNOLOGIST PROVIDED HISTORY: Reason for exam:->V. tach FINDINGS: Normal cardiomediastinal silhouette. Lungs clear. No pneumothorax or effusion. Osseous thorax intact. No acute disease. RECOMMENDATION: Careful clinical correlation and follow up recommended. EKG:  Normal sinus rhythm  Possible Left atrial enlargement  Nonspecific ST abnormality  Abnormal ECG  When compared with ECG of 11-NOV-2022 04:43,  premature ventricular complexes are no longer present  QRS duration has decreased  ST now depressed in Inferior leads  Non-specific change in ST segment in Anterior leads  Nonspecific T wave abnormality, improved in Anterolateral leads   Assessment & Plan   ACTIVE hospital problems being addressed/reassessed for this admission:  Principal Problem:    Ventricular tachycardia  Resolved Problems:    * No resolved hospital problems. *    Code status/DVT prophylaxis and PLAN --see orders   Note extensive time spent coordinating care between ER docs, ER and floor nurses, and transitioning care over to day providers  Plan of care/ clinical impressions/communication specifics detailed below:    78 y.o. male     from [facility] seen doc- since June - no hx of DM or liver etc.  NO hx of CAD  Inc BS, but not on DM2 meds  Inc liver noted also- no hx hep C etc... He has at truck stop sleeping, felt lightheaded - called EMS   Noted to have pulse but in Vtach-- lightheaded, +severe epigastric pain,  hand b/l numbess   +presyncope and confusion improved sp cardioversion in ER and termination of the V. tach. Back in sinus  Feels now 1/10 epigastric discomfort, +HA +hand numbess ? related to lidocaine?        V taqch and hypotension- presyncopal-- now shocked back in sinus  Plan ischemic workup- NO ST elevation  Cycle trop, elevated-- likely NSTEMI- start statin/ASA  echo ordered- cardio consult  Check lipids  Check tsh etc.  On lidocaine drip currently      Inc BS - 234- likely new Dx DM2- check [address] 2 to shock  Inc liver enzymes, acute like shock liver vs chronic hep C etc.- check viral  vs muscle turnover/rhabdo- cycle cmp, CK?      Venkat Walton MD   Night Officer, overnight admitting doctor at [facility] call day time doctor   for questions after 7:30am    Covering for [facility]  If Qs please call [phone]  Electronically signed by Margaret Castillo MD on 11/11/2022 at 6:07 AM

## 2022-11-11 NOTE — ED NOTES
Report and care given to EMS. EMS transfers the PT to their stretcher and transports. Pat at the Cath Lab is called and notified that EMS will be arriving with the PT shortly.        Jil Nj RN  11/11/22 4645

## 2022-11-11 NOTE — Clinical Note
Discharge Plan[de-identified] Other/[facility])   Telemetry/Cardiac Monitoring Required?: Yes

## 2022-11-11 NOTE — H&P
Patient seen and examined. Chart, labs, imaging studies, EKG and rhythm strips reviewed. No full H&P needed. See cardiology consult from earlier today by Dr. Austen Simmons  Patient presented to the [facility] ED with sustained VT and was successfully cardioverted and placed on Lidocaine  Was transferred for cath +/- PCI  Risks, benefits and alternative therapies to the procedure explained.  He understood and consented to proceed  Further recommendations to follow    Electronically signed by Sofia Cortes MD on 11/11/2022 at 1:41 PM

## 2022-11-11 NOTE — CONSULTS
[address] and [address] Electrophysiology  Consultation Report  PATIENT: [address] RECORD NUMBER: [id number]  DATE OF SERVICE:  11/11/2022  ATTENDING ELECTROPHYSIOLOGIST: Rexann Aase, MD  PRIMARY ELECTROPHYSIOLOGIST: Kari Hamilton  REFERRING PHYSICIAN: Deep Rodriguez MD and No primary care provider on file. CHIEF COMPLAINT: Palpitations and near syncope    HPI: This is a 78 y.o. male with no significant prior medical or cardiac history, a resident of [facility] and traveling through this area with his truck who presents to the hospital with sustained monomorphic VT. The patient has not had any regular medical care although he says he has a primary care physician in [facility]. He is on no medications at home. He has noted symptoms of lightheadedness and fatigue in the recent months but since he could not retire he continued to work as a  and was traveling through this area. After his evening meal the patient rested in his truck and was awakened with acute diaphoresis and palpitations. The patient says he was \"soaked\" when he awakened but did not seek help for almost 2 hours. Finally when he realized he could not walk he decided to call EMS and was brought to the emergency room. He was cardioverted in the emergency room and transferred to the Cath Lab for urgent coronary angiography. This did not show any need for revascularization and he was referred to cardiac electrophysiology. Cardiac electrophysiology service is consulted for sustained monomorphic VT and LV dysfunction. Patient Active Problem List    Diagnosis Date Noted    Ventricular tachycardia 11/11/2022     Priority: Medium    CAD in native artery 11/11/2022     Priority: Medium    Nonischemic cardiomyopathy ([address] 11/11/2022     Priority: Medium    Lightheadedness 11/11/2022     Priority: Medium    Elevated transaminase level 11/11/2022     Priority: Medium       History reviewed. No pertinent past medical history. History reviewed. No pertinent family history. Social History     Tobacco Use    Smoking status: Former     Types: Cigarettes    Smokeless tobacco: Not on file   Substance Use Topics    Alcohol use: Not on file       Current Facility-Administered Medications   Medication Dose Route Frequency Provider Last Rate Last Admin    [START ON 11/12/2022] aspirin chewable tablet 81 mg  81 mg Oral Daily Judie Weston MD        metoprolol succinate (TOPROL XL) extended release tablet 25 mg  25 mg Oral BID Judie Weston MD        sodium chloride flush 0.9 % injection 5-40 mL  5-40 mL IntraVENous 2 times per day Judie Weston MD        sodium chloride flush 0.9 % injection 5-40 mL  5-40 mL IntraVENous PRN Judie Weston MD        0.9 % sodium chloride infusion   IntraVENous PRN Judie Weston MD        acetaminophen (TYLENOL) tablet 650 mg  650 mg Oral Q4H PRN Judie Weston MD        perflutren lipid microspheres (DEFINITY) injection 1.5 mL  1.5 mL IntraVENous ONCE PRN Judie Weston MD        [START ON 11/12/2022] enoxaparin (LOVENOX) injection 40 mg  40 mg SubCUTAneous Daily Judie Weston MD        sacubitril-valsartan (ENTRESTO) 24-26 MG per tablet 1 tablet  1 tablet Oral BID Judie Weston MD        lidocaine 2000 mg in dextrose 5% 500 mL infusion  2 mg/min IntraVENous Continuous Judie Weston MD            No Known Allergies    ROS:   Constitutional: Negative for fever, activity change and appetite change. HENT: Negative for epistaxis. Eyes: Negative for diploplia, blurred vision. Respiratory: Negative for cough, chest tightness, shortness of breath and wheezing. Cardiovascular: pertinent positives in HPI  Gastrointestinal: Negative for abdominal pain and blood in stool.    All other review of systems are negative     PHYSICAL EXAM:   Vitals:    11/11/22 1331   BP: (!) 160/112   Pulse: 81   Resp: 18   Temp: 98.4 °F (36.9 °C)   TempSrc: Temporal   SpO2: 97%   Weight: 180 lb (81.6 kg)   Height: 7' 1\" (2.159 m)      Constitutional: Well-developed, no acute distress  Eyes: conjunctivae normal, no xanthelasma   Ears, Nose, Throat: oral mucosa moist, no cyanosis   CV: no JVD, no leg edema, laterally displaced PMI, normal S1 and S2 with left sternal border and apex  Lungs: clear to auscultation bilaterally, normal respiratory effort without used of accessory muscles  Abdomen: soft, non-tender, bowel sounds present, no masses or hepatomegaly   Musculoskeletal: no digital clubbing, no edema   Skin: warm, no rashes     I have personally reviewed the laboratory, cardiac diagnostic and radiographic testing as outlined below:    Data:    Recent Labs     22   WBC 6.9   HGB 15.0   HCT 44.8        Recent Labs     22    140   K 3.7 3.2*    107   CO2 22 22   BUN 25* 24*   CREATININE 1.1 0.9   CALCIUM 8.5* 8.2*      Lab Results   Component Value Date/Time    MG 2.0 2022 04:49 AM     Recent Labs     22  06   TSH 1.260     Recent Labs     22   INR 1.1       CXR:   FINDINGS:   Normal cardiomediastinal silhouette. Lungs clear. No pneumothorax or   effusion. Osseous thorax intact. Impression   No acute disease. RECOMMENDATION:   Careful clinical correlation and follow up recommended. Telemetry: Sinus rhythm    EK22 Sinus rhythm   SM VT on presentation: See below      Echocardiogram: Final results pending    Cardiac Catheterization: 22  CONCLUSIONS:  1.  Mild coronary artery disease. a. Left main, no significant disease. b.  LAD, 40-50% ostial vessel narrowing and 40% mid vessel narrowing.  c.  LCX, 40% proximal vessel narrowing after a high first marginal  branch (intermediate ramus branch). d.  RCA, dominant vessel with around 30-40% proximal/mid vessel  narrowing.   2.  Dilated left ventricle with severe generalized hypokinesis with an  estimated ejection fraction of 20-25% (with mild mitral regurgitation). 3.  Mildly elevated left ventricular end-diastolic pressure. Kevin Lopez MD     D: 11/11/2022 14:52:18             I have independently reviewed all of the ECGs and rhythm strips per above     Assessment/Plan: This is a 78 y.o. male with a history of     1. Sustained monomorphic VT--right bundle, inferior axis morphology, cycle length 280- 320 ms  Urgent cardioversion in the emergency room    2. Coronary artery disease--mild to moderate  Coronary angiography results as noted above    3. Severe LV dysfunction/HFrEF  LVEF of 20-25% noted on cardiac catheterization. Patient denies any prior history of heart failure symptoms. No prior history of hypertension or any major viral illnesses in the recent past therefore etiology is unclear    4. Abnormal liver function test possibly related to persistent hypotension  prior to presentation to the hospital.      Recommendations:    Cardiac MRI to further delineate the etiology of his structural heart disease. Agree with current medical therapy including Toprol and Entresto  Wean off IV lidocaine in the next 24 to 48 hours  ICD implantation prior to discharge. Discussed with patient. I have spent a total of 60-70 minutes with the patient reviewing the above stated recommendations. And a total of >50% of that time involved face-to-face time providing counseling and or coordination of care with the other providers, reviewing records/tests, counseling/education of the patient, ordering medications/tests/procedures, coordinating care, and documenting clinical information in the EHR. Thank you for allowing me to participate in your patient's care. Please call me if there are any questions or concerns.       Cesar Madrigal MD  Cardiac Electrophysiology  [facility]  [facility] [place] [place] [facility]  5:39 PM  11/11/2022

## 2022-11-11 NOTE — H&P
History and Physical      CHIEF COMPLAINT: Lightheadedness found to be in ventricular tachycardia      HISTORY OF PRESENT ILLNESS:      The patient is a 78 y.o. male patient of out-of-state PCP previously healthy  who presents with ventricular tachycardia from [facility]. Patient was at a truck stop when he developed lightheadedness. He called EMS and was found to be in VT. He was cardioverted in the ED with normal sinus rhythm. He was transferred to [address] [place] [address] for left heart catheterization. Left heart catheterization 11/11/2022 demonstrates mild nonobstructive CAD with ejection fraction of 20 to 25% with dilated LV and generalized hypokinesis. Patient is admitted for further evaluation and treatment. Patient does note that he had a upper respiratory tract infection in June which lasted for about a week but feels like he recovered fully from that. He denies cocaine abuse. Denies palpitations or heart racing. Denies tobacco and alcohol abuse. Past Medical History:    History reviewed. No pertinent past medical history. Past Surgical History:    History reviewed. No pertinent surgical history. Medications Prior to Admission:    Medications Prior to Admission: [START ON 11/12/2022] aspirin 81 MG chewable tablet, Take 1 tablet by mouth daily  metoprolol succinate (TOPROL XL) 25 MG extended release tablet, Take 1 tablet by mouth 2 times daily    Allergies:    Patient has no known allergies. Social History:    reports that he has quit smoking. His smoking use included cigarettes. He does not have any smokeless tobacco history on file. Family History:   pt denies contributory history     REVIEW OF SYSTEMS:  As above in the HPI, otherwise negative    PHYSICAL EXAM:    Vitals:  BP (!) 160/112   Pulse 81   Temp 98.4 °F (36.9 °C) (Temporal)   Resp 18   Ht 7' 1\" (2.159 m)   Wt 180 lb (81.6 kg)   SpO2 97%   BMI 17.52 kg/m²     General:  Awake, alert, oriented X 3.   Well developed, well nourished, well groomed. No apparent distress. HEENT:  Normocephalic, atraumatic. Pupils equal, round, reactive to light. No scleral icterus. No conjunctival injection. Normal lips, teeth, and gums. No nasal discharge. Neck:  Supple  Heart:  RRR, no murmurs, gallops, rubs  Lungs:  CTA bilaterally, bilat symmetrical expansion, no wheeze, rales, or rhonchi  Abdomen:   Bowel sounds present, soft, nontender, no masses, no organomegaly, no peritoneal signs  Extremities:  No clubbing, cyanosis, or edema  Skin:  Warm and dry, no open lesions or rash  Neuro:  Cranial nerves 2-12 intact, no focal deficits  Breast: deferred  Rectal: deferred  Genitalia:  deferred    LABS:    CBC with Differential:    Lab Results   Component Value Date/Time    WBC 6.9 11/11/2022 04:49 AM    RBC 5.13 11/11/2022 04:49 AM    HGB 15.0 11/11/2022 04:49 AM    HCT 44.8 11/11/2022 04:49 AM     11/11/2022 04:49 AM    MCV 87.3 11/11/2022 04:49 AM    MCH 29.2 11/11/2022 04:49 AM    MCHC 33.5 11/11/2022 04:49 AM    RDW 13.2 11/11/2022 04:49 AM     CMP:    Lab Results   Component Value Date/Time     11/11/2022 06:28 AM    K 3.2 11/11/2022 06:28 AM     11/11/2022 06:28 AM    CO2 22 11/11/2022 06:28 AM    BUN 24 11/11/2022 06:28 AM    CREATININE 0.9 11/11/2022 06:28 AM    LABGLOM >60 11/11/2022 06:28 AM    GLUCOSE 175 11/11/2022 06:28 AM    PROT 5.7 11/11/2022 06:28 AM    LABALBU 3.7 11/11/2022 06:28 AM    CALCIUM 8.2 11/11/2022 06:28 AM    BILITOT 0.4 11/11/2022 06:28 AM    ALKPHOS 95 11/11/2022 06:28 AM     11/11/2022 06:28 AM     11/11/2022 06:28 AM     Magnesium:    Lab Results   Component Value Date/Time    MG 2.0 11/11/2022 04:49 AM     Phosphorus:  No results found for: PHOS  PT/INR:    Lab Results   Component Value Date/Time    PROTIME 12.2 11/11/2022 04:49 AM    INR 1.1 11/11/2022 04:49 AM     Last 3 Troponin:  No results found for: TROPONINI  U/A:  No results found for: NITRITE, COLORU, Christ Freeze, LABCAST, WBCUA, RBCUA, MUCUS, TRICHOMONAS, YEAST, BACTERIA, CLARITYU, SPECGRAV, LEUKOCYTESUR, UROBILINOGEN, BILIRUBINUR, BLOODU, GLUCOSEU, AMORPHOUS  ABG:  No results found for: PH, PCO2, PO2, HCO3, BE, THGB, TCO2, O2SAT  HgBA1c:    Lab Results   Component Value Date/Time    LABA1C 5.7 11/11/2022 06:28 AM     FLP:  No results found for: TRIG, HDL, LDLCALC, LDLDIRECT, LABVLDL  TSH:    Lab Results   Component Value Date/Time    TSH 1.260 11/11/2022 06:28 AM       No orders to display       ASSESSMENT:      Principal Problem:    Ventricular tachycardia  Active Problems:    CAD in native artery    Nonischemic cardiomyopathy ([address]    Lightheadedness  Resolved Problems:    * No resolved hospital problems. *      PLAN:    Ventricular tachycardia status post DCCV, nonobstructive CAD status post left heart cath 11/11/2022, nonischemic cardiomyopathy   admit to telemetry  GDMT-metoprolol succinate and Entresto  Lidocaine gtt.   EP consult-discussed case  Will likely need AICD    Elevated LFTs-suspected secondary to hypoperfusion with VT   viral hepatitis panel  Right upper quadrant ultrasound    PT/OT  DVT PPx  DC planning requires inpatient admission    Electronically signed by Allegra Blackburn MD on 11/11/2022 at 5:24 PM

## 2022-11-11 NOTE — PROGRESS NOTES
Database initiated pharmacy and medications verified with the patient. He is A&O from home alone. States he uses no assistive devices and is RA at baseline. Also states he takes no home medications.

## 2022-11-11 NOTE — ED NOTES
Report given to Dior Lorenzo at the [facility].      Portillo Headley RN  11/11/22 Jayce Mccoy RN  11/11/22 9037

## 2022-11-11 NOTE — PROCEDURES
[address]                  [address] [place],  [facility]                            CARDIAC CATHETERIZATION    PATIENT NAME: Camilla Quigley                     :        1955  MED REC NO:   [id number]                            ROOM:  ACCOUNT NO:   [de-identified]                           ADMIT DATE: 2022  PROVIDER:     Cj Ferrer MD    DATE OF PROCEDURE:  2022    PROCEDURES PERFORMED:  Left heart catheterization, selective coronary  angiography, and left ventriculography. The procedure was done through right radial approach using ultrasound  guidance. The patient received intravenous Versed and intravenous fentanyl for  sedation. INDICATION:  Sustained ventricular tachycardia. AUC:  8-58. PRESSURES:  Aorta 109/70 with a mean of 88. Left ventricular systolic pressure 506, left ventricular end-diastolic  pressure 16. There was no significant gradient across the aortic valve. CORONARY ANGIOGRAPHY:  Left main:  The left main artery did not appear to have any significant  angiographic disease. LAD:  The left anterior descending artery had a smooth 40-50% ostial  narrowing. There was luminal irregularities in the mid LAD after the  first diagonal branch with around 40% angiographic luminal narrowing. The distal LAD did not appear to have any significant angiographic  disease. The diagonal branch did not appear to have any significant  angiographic disease. LCX:  The left circumflex gives rise to a high first marginal branch  (intermediate ramus branch), which is moderate in size and did not  appear to have any significant angiographic disease. The left  circumflex after the marginal branch has luminal irregularities with  around 30% proximal vessel narrowing. RCA:  The right coronary artery is a large dominant vessel.   It has  luminal irregularities along its course with around 30% proximal/mid  vessel disease. LEFT VENTRICULOGRAPHY:  The left ventricle appeared mildly dilated. There was severe generalized hypokinesis with an estimated ejection  fraction of 20%. There was mild mitral regurgitation. The right radial arterial sheath was removed at the end of the procedure  and a TR Band was applied with adequate hemostasis and with preservation  of pulse. The patient tolerated the procedure well and left the cardiac  catheterization laboratory in stable condition. CONCLUSIONS:  1.  Mild coronary artery disease. a. Left main, no significant disease. b.  LAD, 40-50% ostial vessel narrowing and 40% mid vessel narrowing.  c.  LCX, 40% proximal vessel narrowing after a high first marginal  branch (intermediate ramus branch). d.  RCA, dominant vessel with around 30-40% proximal/mid vessel  narrowing. 2.  Dilated left ventricle with severe generalized hypokinesis with an  estimated ejection fraction of 20-25% (with mild mitral regurgitation). 3.  Mildly elevated left ventricular end-diastolic pressure.         Beulah Hoover MD    D: 11/11/2022 14:52:18       T: 11/11/2022 14:55:41     [email or web address]  Job#: [id number]     Doc#: [id number]    CC:

## 2022-11-12 ENCOUNTER — APPOINTMENT (OUTPATIENT)
Dept: ULTRASOUND IMAGING | Age: 67
DRG: 179 | End: 2022-11-12
Attending: INTERNAL MEDICINE
Payer: COMMERCIAL

## 2022-11-12 LAB
ALBUMIN SERPL-MCNC: 3.6 G/DL (ref 3.5–5.2)
ALP BLD-CCNC: 78 U/L (ref 40–129)
ALT SERPL-CCNC: 244 U/L (ref 0–40)
AMPHETAMINE SCREEN, URINE: NOT DETECTED
ANION GAP SERPL CALCULATED.3IONS-SCNC: 11 MMOL/L (ref 7–16)
AST SERPL-CCNC: 144 U/L (ref 0–39)
BARBITURATE SCREEN URINE: NOT DETECTED
BENZODIAZEPINE SCREEN, URINE: POSITIVE
BILIRUB SERPL-MCNC: 0.7 MG/DL (ref 0–1.2)
BUN BLDV-MCNC: 8 MG/DL (ref 6–23)
CALCIUM SERPL-MCNC: 7.9 MG/DL (ref 8.6–10.2)
CANNABINOID SCREEN URINE: NOT DETECTED
CHLORIDE BLD-SCNC: 104 MMOL/L (ref 98–107)
CHOLESTEROL, TOTAL: 116 MG/DL (ref 0–199)
CO2: 23 MMOL/L (ref 22–29)
COCAINE METABOLITE SCREEN URINE: NOT DETECTED
CREAT SERPL-MCNC: 0.8 MG/DL (ref 0.7–1.2)
EKG ATRIAL RATE: 76 BPM
EKG P AXIS: 66 DEGREES
EKG P-R INTERVAL: 134 MS
EKG Q-T INTERVAL: 420 MS
EKG QRS DURATION: 88 MS
EKG QTC CALCULATION (BAZETT): 472 MS
EKG R AXIS: -5 DEGREES
EKG T AXIS: 104 DEGREES
EKG VENTRICULAR RATE: 76 BPM
FENTANYL SCREEN, URINE: NOT DETECTED
GFR SERPL CREATININE-BSD FRML MDRD: >60 ML/MIN/1.73
GLUCOSE BLD-MCNC: 124 MG/DL (ref 74–99)
HCT VFR BLD CALC: 41.1 % (ref 37–54)
HDLC SERPL-MCNC: 42 MG/DL
HEMOGLOBIN: 14.2 G/DL (ref 12.5–16.5)
LACTIC ACID: 1.6 MMOL/L (ref 0.5–2.2)
LDL CHOLESTEROL CALCULATED: 60 MG/DL (ref 0–99)
LV EF: 25 %
LVEF MODALITY: NORMAL
Lab: ABNORMAL
MAGNESIUM: 1.7 MG/DL (ref 1.6–2.6)
MCH RBC QN AUTO: 30.6 PG (ref 26–35)
MCHC RBC AUTO-ENTMCNC: 34.5 % (ref 32–34.5)
MCV RBC AUTO: 88.6 FL (ref 80–99.9)
METHADONE SCREEN, URINE: NOT DETECTED
OPIATE SCREEN URINE: NOT DETECTED
OXYCODONE URINE: NOT DETECTED
PDW BLD-RTO: 13 FL (ref 11.5–15)
PHENCYCLIDINE SCREEN URINE: NOT DETECTED
PLATELET # BLD: 111 E9/L (ref 130–450)
PMV BLD AUTO: 12.8 FL (ref 7–12)
POTASSIUM REFLEX MAGNESIUM: 3.5 MMOL/L (ref 3.5–5)
PRO-BNP: 1979 PG/ML (ref 0–125)
RBC # BLD: 4.64 E12/L (ref 3.8–5.8)
SODIUM BLD-SCNC: 138 MMOL/L (ref 132–146)
TOTAL PROTEIN: 6.1 G/DL (ref 6.4–8.3)
TRIGL SERPL-MCNC: 71 MG/DL (ref 0–149)
VLDLC SERPL CALC-MCNC: 14 MG/DL
WBC # BLD: 7 E9/L (ref 4.5–11.5)

## 2022-11-12 PROCEDURE — 6370000000 HC RX 637 (ALT 250 FOR IP): Performed by: INTERNAL MEDICINE

## 2022-11-12 PROCEDURE — 83735 ASSAY OF MAGNESIUM: CPT

## 2022-11-12 PROCEDURE — 80307 DRUG TEST PRSMV CHEM ANLYZR: CPT

## 2022-11-12 PROCEDURE — 93010 ELECTROCARDIOGRAM REPORT: CPT | Performed by: INTERNAL MEDICINE

## 2022-11-12 PROCEDURE — 6360000002 HC RX W HCPCS: Performed by: INTERNAL MEDICINE

## 2022-11-12 PROCEDURE — 99233 SBSQ HOSP IP/OBS HIGH 50: CPT | Performed by: INTERNAL MEDICINE

## 2022-11-12 PROCEDURE — 2580000003 HC RX 258: Performed by: INTERNAL MEDICINE

## 2022-11-12 PROCEDURE — 85027 COMPLETE CBC AUTOMATED: CPT

## 2022-11-12 PROCEDURE — 80061 LIPID PANEL: CPT

## 2022-11-12 PROCEDURE — 76705 ECHO EXAM OF ABDOMEN: CPT

## 2022-11-12 PROCEDURE — 36415 COLL VENOUS BLD VENIPUNCTURE: CPT

## 2022-11-12 PROCEDURE — 2140000000 HC CCU INTERMEDIATE R&B

## 2022-11-12 PROCEDURE — 93306 TTE W/DOPPLER COMPLETE: CPT

## 2022-11-12 PROCEDURE — 83880 ASSAY OF NATRIURETIC PEPTIDE: CPT

## 2022-11-12 PROCEDURE — 83605 ASSAY OF LACTIC ACID: CPT

## 2022-11-12 PROCEDURE — 80053 COMPREHEN METABOLIC PANEL: CPT

## 2022-11-12 RX ORDER — POTASSIUM CHLORIDE 20 MEQ/1
40 TABLET, EXTENDED RELEASE ORAL ONCE
Status: COMPLETED | OUTPATIENT
Start: 2022-11-12 | End: 2022-11-12

## 2022-11-12 RX ORDER — LANOLIN ALCOHOL/MO/W.PET/CERES
400 CREAM (GRAM) TOPICAL DAILY
Status: DISCONTINUED | OUTPATIENT
Start: 2022-11-12 | End: 2022-11-17 | Stop reason: HOSPADM

## 2022-11-12 RX ORDER — SPIRONOLACTONE 25 MG/1
25 TABLET ORAL DAILY
Status: DISCONTINUED | OUTPATIENT
Start: 2022-11-12 | End: 2022-11-17 | Stop reason: HOSPADM

## 2022-11-12 RX ADMIN — ENOXAPARIN SODIUM 40 MG: 100 INJECTION SUBCUTANEOUS at 09:12

## 2022-11-12 RX ADMIN — SODIUM CHLORIDE, PRESERVATIVE FREE 10 ML: 5 INJECTION INTRAVENOUS at 21:04

## 2022-11-12 RX ADMIN — SODIUM CHLORIDE, PRESERVATIVE FREE 10 ML: 5 INJECTION INTRAVENOUS at 09:12

## 2022-11-12 RX ADMIN — SACUBITRIL AND VALSARTAN 1 TABLET: 24; 26 TABLET, FILM COATED ORAL at 21:04

## 2022-11-12 RX ADMIN — ACETAMINOPHEN 650 MG: 325 TABLET, FILM COATED ORAL at 05:12

## 2022-11-12 RX ADMIN — LIDOCAINE HYDROCHLORIDE 1 MG/MIN: 4 INJECTION, SOLUTION INTRAVENOUS at 15:38

## 2022-11-12 RX ADMIN — METOPROLOL SUCCINATE 25 MG: 25 TABLET, FILM COATED, EXTENDED RELEASE ORAL at 09:11

## 2022-11-12 RX ADMIN — MAGNESIUM OXIDE 400 MG (241.3 MG MAGNESIUM) TABLET 400 MG: TABLET at 09:11

## 2022-11-12 RX ADMIN — SACUBITRIL AND VALSARTAN 1 TABLET: 24; 26 TABLET, FILM COATED ORAL at 09:11

## 2022-11-12 RX ADMIN — SPIRONOLACTONE 25 MG: 25 TABLET ORAL at 17:00

## 2022-11-12 RX ADMIN — POTASSIUM CHLORIDE 40 MEQ: 1500 TABLET, EXTENDED RELEASE ORAL at 09:11

## 2022-11-12 RX ADMIN — ASPIRIN 81 MG CHEWABLE TABLET 81 MG: 81 TABLET CHEWABLE at 09:10

## 2022-11-12 RX ADMIN — METOPROLOL SUCCINATE 25 MG: 25 TABLET, FILM COATED, EXTENDED RELEASE ORAL at 21:04

## 2022-11-12 ASSESSMENT — PAIN SCALES - GENERAL
PAINLEVEL_OUTOF10: 0
PAINLEVEL_OUTOF10: 0
PAINLEVEL_OUTOF10: 2
PAINLEVEL_OUTOF10: 0
PAINLEVEL_OUTOF10: 5

## 2022-11-12 ASSESSMENT — PAIN DESCRIPTION - PAIN TYPE: TYPE: ACUTE PAIN

## 2022-11-12 ASSESSMENT — PAIN DESCRIPTION - ONSET: ONSET: GRADUAL

## 2022-11-12 ASSESSMENT — PAIN DESCRIPTION - FREQUENCY: FREQUENCY: CONTINUOUS

## 2022-11-12 ASSESSMENT — PAIN DESCRIPTION - ORIENTATION: ORIENTATION: ANTERIOR

## 2022-11-12 ASSESSMENT — PAIN DESCRIPTION - LOCATION: LOCATION: HEAD

## 2022-11-12 ASSESSMENT — PAIN - FUNCTIONAL ASSESSMENT: PAIN_FUNCTIONAL_ASSESSMENT: ACTIVITIES ARE NOT PREVENTED

## 2022-11-12 NOTE — PROGRESS NOTES
Hospitalist Progress Note      Synopsis: Patient admitted as a transfer from [address] [place] [address] for left heart cath. Patient presented to [address] [place] [address] ED with complaints of lightheadedness. In ED he was found to be in V. tach which was terminated by synchronized cardioversion. Cardiology was consulted and he was placed on lidocaine infusion. He was transferred here for left heart cath. LHC revealed mild coronary artery disease, a dilated left ventricle, and severe generalized hypokinesis with an EF of 20 to 25%. EP was consulted for further evaluation and consideration for ICD implantation. A cardiac MRI was ordered to further evaluate etiology of cardiomyopathy. He will need ICD placement prior to discharge. Hospital day 1     Subjective:  Stable overnight. No issues reported. Patient seen and examined. With no complaints. Does report significant life stressors as of recently, primarily financial.  He reports he is a  whose primary residence is in [facility]. He has no money. He is unable to apply himself food. Records reviewed. Temp (24hrs), Av.4 °F (36.9 °C), Min:97.2 °F (36.2 °C), Max:99.3 °F (37.4 °C)    DIET: Diet NPO  CODE: Full Code    Intake/Output Summary (Last 24 hours) at 2022 0746  Last data filed at 2022 0515  Gross per 24 hour   Intake 754.8 ml   Output 1500 ml   Net -745.2 ml       Review of Systems: All bolded are positive; please see HPI  General:  Fever, chills, diaphoresis, fatigue, malaise, night sweats, weight loss  Psychological:  Anxiety, disorientation, hallucinations. ENT:  Epistaxis, headaches, vertigo, visual changes. Cardiovascular:  Chest pain, irregular heartbeats, palpitations, paroxysmal nocturnal dyspnea. Respiratory:  Shortness of breath, coughing, sputum production, hemoptysis, wheezing, orthopnea.   Gastrointestinal:  Nausea, vomiting, diarrhea, heartburn, constipation, abdominal pain, hematemesis, hematochezia, melena, acholic stools  Genito-Urinary:  Dysuria, urgency, frequency, hematuria  Musculoskeletal:  Joint pain, joint stiffness, joint swelling, muscle pain  Neurology:  Headache, focal neurological deficits, weakness, numbness, paresthesia  Derm:  Rashes, ulcers, excoriations, bruising  Extremities:  Decreased ROM, peripheral edema, mottling    Objective:    /83   Pulse 83   Temp 99.3 °F (37.4 °C) (Oral)   Resp 18   Ht 7' 1\" (2.159 m)   Wt 177 lb 8 oz (80.5 kg)   SpO2 95%   BMI 17.27 kg/m²     General appearance: Elderly male in no apparent distress, appears stated age and cooperative. HEENT: Conjunctivae/corneas clear. Mucous membranes moist.  Neck: Supple. No JVD. Respiratory:  Clear to auscultation bilaterally. Normal respiratory effort. Cardiovascular:  RRR. S1, S2 without MRG. PV: Pulses palpable. No edema. Abdomen: Soft, non-tender, non-distended. +BS  Musculoskeletal: No obvious deformities. Skin: Normal skin color. No rashes or lesions. Good turgor. Neurologic:  Grossly non-focal. Awake, alert, following commands.    Psychiatric: Alert and oriented, thought content appropriate, normal insight and judgement    Medications:  REVIEWED DAILY    Infusion Medications    sodium chloride      lidocaine 2 mg/min (11/11/22 2232)     Scheduled Medications    aspirin  81 mg Oral Daily    metoprolol succinate  25 mg Oral BID    sodium chloride flush  5-40 mL IntraVENous 2 times per day    enoxaparin  40 mg SubCUTAneous Daily    sacubitril-valsartan  1 tablet Oral BID     PRN Meds: sodium chloride flush, sodium chloride, acetaminophen, perflutren lipid microspheres    Labs:     Recent Labs     11/11/22  0449 11/12/22  0608   WBC 6.9 7.0   HGB 15.0 14.2   HCT 44.8 41.1    111*       Recent Labs     11/11/22  0449 11/11/22  0628 11/11/22  2156    140  --    K 3.7 3.2* 3.6    107  --    CO2 22 22  --    BUN 25* 24*  --    CREATININE 1.1 0.9  --    CALCIUM 8.5* 8.2*  --        Recent Labs 11/11/22  0449 11/11/22  0628   PROT 6.2* 5.7*   ALKPHOS 103 95   * 330*   * 309*   BILITOT 0.6 0.4   LIPASE 39  --        Recent Labs     11/11/22 0449   INR 1.1       No results for input(s): CKTOTAL, TROPONINI in the last 72 hours. Chronic labs:    Lab Results   Component Value Date    TSH 1.260 11/11/2022    INR 1.1 11/11/2022    LABA1C 5.7 (H) 11/11/2022       Radiology: REVIEWED DAILY    Assessment:  Nonischemic cardiomyopathy-EF 20 to 25%  V. tach secondary to above  Elevated high-sensitivity troponin  Mild CAD  Elevated LFTs, presumably secondary to hypoperfusion  Hypokalemia-resolved  Lactic acidosis-resolved      Plan:  Cardiology and EP following  Cardiac MRI to further evaluate etiology of cardiomyopathy-likely not until Monday  Lidocaine titration per cardiology/EP  Plan for ICD insertion prior to discharge  GDMT-Toprol, Entresto  Monitor LFTs-downtrending  RUQ ultrasound without acute process    DVT Prophylaxis [x] Lovenox  []  Heparin [] DOAC [] PCDs [] Ambulation    GI Prophylaxis [] PPI  [] H2 Blocker   [] Carafate  [x] Diet/Tube Feeds   Level of care [] Med/Surg  [x] Intermediate  []  ICU   Diet Diet NPO    Family contact [x]  N/A    [] At bedside  [] Phone call     Discharge Plan: Home pending cardiac MRI and ICD implantation    +++++++++++++++++++++++++++++++++++++++++++++++++  Tiffany [address]  +++++++++++++++++++++++++++++++++++++++++++++++++  NOTE: This report was transcribed using voice recognition software. Every effort was made to ensure accuracy; however, inadvertent computerized transcription errors may be present.

## 2022-11-12 NOTE — PROGRESS NOTES
Inpatient Cardiology Progress note     PATIENT IS BEING FOLLOWED FOR: ventricular tachycardia. Non ischemic cardiomyopathy with severe LV systolic dysfunction    Alicia Mcgrath is a 78 y.o. male seen in intial consultation by Dr. Austen Simmons 11/11/22     Mr. Angelic Craft is a 78year old gentleman who is previously unknown to our practice; he denies history of coronary disease, heart failure, or arrhythmia. He is from [facility] and is a long distance . He reports feeling weak and having decreased activity tolerance since spring of this year. The night of 11/10/22, he ate [employer] and after waking from a nap, he felt flushed, dizzy, had numbness in his extremities and his head, and had epigastric discomfort. He tried to shower, but was unable to stand long enough. His symptoms lasted about two hours before he called 911. He was found to be in VT and successfully cardioverted, in the ED, then placed on Lidocaine drip and also given magnesium sulfate. Troponin levels were 93>>88>>99 and LFTs were significantly elevated. SUBJECTIVE: Still complains of on and off epigastric discomfort. Denies CP, SOB, palpitations or lightheadedness  OBJECTIVE: No apparent distress     ROS:  Consist: Denies fevers, chills or night sweats  Heart: Denies chest pain, palpitations, lightheadedness, dizziness or syncope  Lungs: Denies SOB, cough, wheezing, orthopnea or PND  GI: Denies nausea, vomiting or diarrhea    PHYSICAL EXAM:   /74   Pulse 74   Temp 98.7 °F (37.1 °C) (Oral)   Resp 18   Ht 7' 1\" (2.159 m)   Wt 177 lb 8 oz (80.5 kg)   SpO2 (!) 87%   BMI 17.27 kg/m²    B/P Range last 24 hours: Systolic (92EXK), KES:465 , Min:113 , CRK:936    Diastolic (99IHJ), RTT:95, Min:66, Max:95    CONST: Well developed, well nourished male who appears of stated age. Awake, alert and cooperative.  No apparent distress  HEENT:   Head- Normocephalic, atraumatic   Eyes- Conjunctivae pink, anicteric  Throat- Oral mucosa pink and moist  Neck-  No stridor, trachea midline, no jugular venous distention. No carotid bruit  CHEST: Chest symmetrical and non-tender to palpation. No accessory muscle use or intercostal retractions  RESPIRATORY:  Lung sounds - clear throughout fields   CARDIOVASCULAR:     Heart Inspection- shows no noted pulsations  Heart Palpation- no heaves or thrills; PMI is non-displaced   Heart Ausculation- Regular rate and rhythm, no murmur. No s3, s4 or rub   PV: No lower extremity edema. No varicosities. Pedal pulses palpable, no clubbing or cyanosis. Right radial access site without hematoma and with preserved pulse   ABDOMEN: Soft, non-tender to light palpation. Bowel sounds present. No palpable masses no organomegaly; no abdominal bruit  MS: Good muscle strength and tone. No atrophy or abnormal movements. : Deferred  SKIN: Warm and dry no statis dermatitis or ulcers   NEURO / PSYCH: Oriented to person, place and time. Speech clear and appropriate. Follows all commands.  Pleasant affect       Intake/Output Summary (Last 24 hours) at 11/12/2022 1350  Last data filed at 11/12/2022 1116  Gross per 24 hour   Intake 754.8 ml   Output 2600 ml   Net -1845.2 ml       Weight:   Wt Readings from Last 3 Encounters:   11/12/22 177 lb 8 oz (80.5 kg)   11/11/22 180 lb (81.6 kg)     Current Inpatient Medications:   magnesium oxide  400 mg Oral Daily    aspirin  81 mg Oral Daily    metoprolol succinate  25 mg Oral BID    sodium chloride flush  5-40 mL IntraVENous 2 times per day    enoxaparin  40 mg SubCUTAneous Daily    sacubitril-valsartan  1 tablet Oral BID       IV Infusions (if any):   sodium chloride      lidocaine 1 mg/min (11/12/22 1130)       DIAGNOSTIC/ LABORATORY DATA:  Labs:   CBC:   Recent Labs     11/11/22  0449 11/12/22  0608   WBC 6.9 7.0   HGB 15.0 14.2   HCT 44.8 41.1    111*     BMP:   Recent Labs     11/11/22  0628 11/11/22  2156 11/12/22  0608     --  138   K 3.2* 3.6 3.5   CO2 22  --  23   BUN 24*  -- 8   CREATININE 0.9  --  0.8   LABGLOM >60  --  >60   CALCIUM 8.2*  --  7.9*     Mag:   Recent Labs     11/11/22  2156 11/12/22  0608   MG 2.0 1.7     Phos: No results for input(s): PHOS in the last 72 hours. TFT:   Lab Results   Component Value Date    TSH 1.260 11/11/2022    T4FREE 1.17 11/11/2022      HgA1c:   Lab Results   Component Value Date    LABA1C 5.7 (H) 11/11/2022     No results found for: EAG    BNP: No results for input(s): BNP in the last 72 hours. PT/INR:   Recent Labs     11/11/22 0449   PROTIME 12.2   INR 1.1     APTT:No results for input(s): APTT in the last 72 hours. CARDIAC ENZYMES:  Recent Labs     11/11/22  0449 11/11/22  0549 11/11/22  0628   TROPHS 93* 88* 99*     FASTING LIPID PANEL:  Lab Results   Component Value Date/Time    CHOL 116 11/12/2022 06:08 AM    HDL 42 11/12/2022 06:08 AM    LDLCALC 60 11/12/2022 06:08 AM    TRIG 71 11/12/2022 06:08 AM     LIVER PROFILE:  Recent Labs     11/11/22  0628 11/12/22  0608   * 144*   * 244*   LABALBU 3.7 3.6       CXR 11/11/22: No acute disease. 12 lead EKG 11/11/22: SR. Possible left atrial enlargement. Non specific ST changes    Cath 11/11/22:  CONCLUSIONS:  1.  Mild coronary artery disease. a. Left main, no significant disease. b.  LAD, 40-50% ostial vessel narrowing and 40% mid vessel narrowing.  c.  LCX, 40% proximal vessel narrowing after a high first marginal  branch (intermediate ramus branch). d.  RCA, dominant vessel with around 30-40% proximal/mid vessel  narrowing. 2.  Dilated left ventricle with severe generalized hypokinesis with an  estimated ejection fraction of 20-25% (with mild mitral regurgitation). 3.  Mildly elevated left ventricular end-diastolic pressure. Echo: Pending    Telemetry: SR.    ASSESSMENT:   Ventricular tachycardia s/p cardioversion. No recurrence on IV Lidocaine   Non ischemic cardiomyopathy with severe LV systolic dysfunction.  Appears Eu volemic  Mild CAD  Elevated liver enzymes, trending down  Hypokalemia, supplemented and resolved      PLAN:  Add spironolactone  Check BNP, consider diuretic therapy if indicated  Monitor LFT's.  Consider statin therapy  Rest of cardiac medications same for now  IV lidocaine as per EP  Cardiac MRI as per EP  Probable ICD prior to discharge  Will follow    Electronically signed by Jyothi Echeverria MD on 11/12/2022 at 1:50 PM

## 2022-11-12 NOTE — PROGRESS NOTES
[address] Electrophysiology  Inpatient Progress Note  PATIENT: [address] RECORD NUMBER: [id number]  DATE OF SERVICE:  11/12/2022  ATTENDING ELECTROPHYSIOLOGIST: Umesh Rich MD  PRIMARY ELECTROPHYSIOLOGIST: Jacinto Aguirre  REFERRING PHYSICIAN: Khalif Zamarripa MD and No primary care provider on file. CHIEF COMPLAINT: Palpitations and near syncope    HPI: This is a 78 y.o. male with no significant prior medical or cardiac history, a resident of [facility] and traveling through this area with his truck who presents to the hospital with sustained monomorphic VT. The patient has not had any regular medical care although he says he has a primary care physician in [facility]. He is on no medications at home. He has noted symptoms of lightheadedness and fatigue in the recent months but since he could not retire he continued to work as a  and was traveling through this area. After his evening meal the patient rested in his truck and was awakened with acute diaphoresis and palpitations. The patient says he was \"soaked\" when he awakened but did not seek help for almost 2 hours. Finally when he realized he could not walk he decided to call EMS and was brought to the emergency room. He was cardioverted in the emergency room and transferred to the Cath Lab for urgent coronary angiography. This did not show any need for revascularization and he was referred to cardiac electrophysiology. Cardiac electrophysiology service is consulted for sustained monomorphic VT and LV dysfunction. 11/12/22: And denies any complaints. No chest pain or shortness of breath.   No symptoms of lightheadedness    Patient Active Problem List    Diagnosis Date Noted    Ventricular tachycardia 11/11/2022     Priority: Medium    CAD in native artery 11/11/2022     Priority: Medium    Nonischemic cardiomyopathy ([facility] [address] 11/11/2022     Priority: Medium    Lightheadedness 11/11/2022     Priority: Medium    Elevated transaminase level 11/11/2022     Priority: Medium       History reviewed. No pertinent past medical history. History reviewed. No pertinent family history. Social History     Tobacco Use    Smoking status: Former     Types: Cigarettes    Smokeless tobacco: Not on file   Substance Use Topics    Alcohol use: Not on file       Current Facility-Administered Medications   Medication Dose Route Frequency Provider Last Rate Last Admin    magnesium oxide (MAG-OX) tablet 400 mg  400 mg Oral Daily Bernardo Escamilla MD   400 mg at 11/12/22 0911    aspirin chewable tablet 81 mg  81 mg Oral Daily Amaury Thurston MD   81 mg at 11/12/22 0910    metoprolol succinate (TOPROL XL) extended release tablet 25 mg  25 mg Oral BID Amaury Thurston MD   25 mg at 11/12/22 0911    sodium chloride flush 0.9 % injection 5-40 mL  5-40 mL IntraVENous 2 times per day Amaury Thurston MD   10 mL at 11/12/22 0912    sodium chloride flush 0.9 % injection 5-40 mL  5-40 mL IntraVENous PRN Amaury Thurston MD        0.9 % sodium chloride infusion   IntraVENous PRN Amaury Thurston MD        acetaminophen (TYLENOL) tablet 650 mg  650 mg Oral Q4H PRN Amaury Thurston MD   650 mg at 11/12/22 4795    perflutren lipid microspheres (DEFINITY) injection 1.5 mL  1.5 mL IntraVENous ONCE PRN Amaury Thurston MD        enoxaparin (LOVENOX) injection 40 mg  40 mg SubCUTAneous Daily Amaury Thurston MD   40 mg at 11/12/22 0912    sacubitril-valsartan (ENTRESTO) 24-26 MG per tablet 1 tablet  1 tablet Oral BID Amaury Thurston MD   1 tablet at 11/12/22 0911    lidocaine 2000 mg in dextrose 5% 500 mL infusion  1 mg/min IntraVENous Continuous Bernardo Escamilla MD 15 mL/hr at 11/12/22 1130 1 mg/min at 11/12/22 1130        No Known Allergies    ROS:   Constitutional: Negative for fever, activity change and appetite change. HENT: Negative for epistaxis. Eyes: Negative for diploplia, blurred vision.    Respiratory: Negative for cough, chest tightness, shortness of breath and wheezing. Cardiovascular: pertinent positives in HPI  Gastrointestinal: Negative for abdominal pain and blood in stool. All other review of systems are negative     PHYSICAL EXAM:   Vitals:    11/11/22 2248 11/12/22 0402 11/12/22 0827 11/12/22 1114   BP: (!) 129/90 123/83 119/81 113/74   Pulse: 79 83 68 74   Resp: 17 18 18 18   Temp: 98.6 °F (37 °C) 99.3 °F (37.4 °C) 98.5 °F (36.9 °C) 98.7 °F (37.1 °C)   TempSrc: Temporal Oral Oral Oral   SpO2: 95% 95% 97% (!) 87%   Weight:  177 lb 8 oz (80.5 kg)     Height:          Constitutional: Well-developed, no acute distress  Eyes: conjunctivae normal, no xanthelasma   Ears, Nose, Throat: oral mucosa moist, no cyanosis   CV: no JVD, no leg edema, laterally displaced PMI, normal S1 and S2 with left sternal border and apex  Lungs: clear to auscultation bilaterally, normal respiratory effort without used of accessory muscles  Abdomen: soft, non-tender, bowel sounds present, no masses or hepatomegaly   Musculoskeletal: no digital clubbing, no edema   Skin: warm, no rashes     I have personally reviewed the laboratory, cardiac diagnostic and radiographic testing as outlined below:    Data:    Recent Labs     11/11/22 0449 11/12/22  0608   WBC 6.9 7.0   HGB 15.0 14.2   HCT 44.8 41.1    111*     Recent Labs     11/11/22 0449 11/11/22  0628 11/11/22  2156 11/12/22  0608    140  --  138   K 3.7 3.2* 3.6 3.5    107  --  104   CO2 22 22  --  23   BUN 25* 24*  --  8   CREATININE 1.1 0.9  --  0.8   CALCIUM 8.5* 8.2*  --  7.9*      Lab Results   Component Value Date/Time    MG 1.7 11/12/2022 06:08 AM     Recent Labs     11/11/22  0628   TSH 1.260     Recent Labs     11/11/22 0449   INR 1.1       CXR:   FINDINGS:   Normal cardiomediastinal silhouette. Lungs clear. No pneumothorax or   effusion. Osseous thorax intact. Impression   No acute disease.        RECOMMENDATION:   Careful clinical correlation and follow up recommended. Telemetry: Sinus rhythm    EK22 Sinus rhythm   SM VT on presentation: See below      Echocardiogram: Final results pending    Cardiac Catheterization: 22  CONCLUSIONS:  1.  Mild coronary artery disease. a. Left main, no significant disease. b.  LAD, 40-50% ostial vessel narrowing and 40% mid vessel narrowing.  c.  LCX, 40% proximal vessel narrowing after a high first marginal  branch (intermediate ramus branch). d.  RCA, dominant vessel with around 30-40% proximal/mid vessel  narrowing. 2.  Dilated left ventricle with severe generalized hypokinesis with an  estimated ejection fraction of 20-25% (with mild mitral regurgitation). 3.  Mildly elevated left ventricular end-diastolic pressure. Chaya Chopra MD     D: 2022 14:52:18             I have independently reviewed all of the ECGs and rhythm strips per above     Assessment/Plan: This is a 78 y.o. male with a history of     1. Sustained monomorphic VT--right bundle, inferior axis morphology, cycle length 280- 320 ms  Urgent cardioversion in the emergency room. Now on Toprol-XL 25 mg twice a day    2. Coronary artery disease--mild to moderate  Coronary angiography results as noted above    3. Severe LV dysfunction/HFrEF  LVEF of 20-25% noted on cardiac catheterization. Patient denies any prior history of heart failure symptoms. No prior history of hypertension or any major viral illnesses in the recent past therefore etiology is unclear  Patient placed on GDMT--Toprol and Entresto    4. Abnormal liver function test possibly related to persistent hypotension  prior to presentation to the hospital.      Recommendations:    Cardiac MRI to further delineate the etiology of his structural heart disease--next week  Agree with current medical therapy including Toprol and Entresto  Wean off IV lidocaine in the next 24 to 48 hours  ICD implantation prior to discharge. Discussed with patient.   I also spoke to his primary care physician in [facility] about his medical issues after obtaining permission from the patient who requested that I call. The patient participated in shared decision-making for ICD implantation. The procedure was described in detail. The risks, benefits, and alternatives to ICD implantation and possible defibrillation threshold testing were discussed with the patient. These risks include but are not limited to bleeding, infection, blood clot, pneumothorax, hemothorax, cardiac valve damage, cardiac perforation and tamponade required emergent thoracotomy, contrast induced nephropathy leading to short or even long term dialysis, vascular injury requiring emergent surgical repair, lead dislodgement required reposition, stroke and even death. The patient understands these risks and wishes to proceed with ICD implantation and possible defibrillation threshold testing. I have spent a total of 35-45 minutes with the patient reviewing the above stated recommendations. And a total of >50% of that time involved face-to-face time providing counseling and or coordination of care with the other providers, reviewing records/tests, counseling/education of the patient, ordering medications/tests/procedures, coordinating care, and documenting clinical information in the EHR. Thank you for allowing me to participate in your patient's care. Please call me if there are any questions or concerns.       Lisa Lomeli MD  Cardiac Electrophysiology  [facility]) Physicians  [facility] [place]: [facility]  1:31 PM  11/12/2022

## 2022-11-12 NOTE — CARE COORDINATION
Received junior about help with meds. Sw reviewed [address] listed. Assigned sw/cm can follow up when pt is closer to discharge. Sw left message for public benefits,to determine if pt is eligible for any assistance.   Electronically signed by OTTO Mcgill on 11/12/2022 at 1:33 PM

## 2022-11-13 LAB
ALBUMIN SERPL-MCNC: 4 G/DL (ref 3.5–5.2)
ALP BLD-CCNC: 85 U/L (ref 40–129)
ALT SERPL-CCNC: 184 U/L (ref 0–40)
ANION GAP SERPL CALCULATED.3IONS-SCNC: 13 MMOL/L (ref 7–16)
AST SERPL-CCNC: 69 U/L (ref 0–39)
BILIRUB SERPL-MCNC: 0.9 MG/DL (ref 0–1.2)
BUN BLDV-MCNC: 10 MG/DL (ref 6–23)
CALCIUM SERPL-MCNC: 9 MG/DL (ref 8.6–10.2)
CHLORIDE BLD-SCNC: 105 MMOL/L (ref 98–107)
CO2: 23 MMOL/L (ref 22–29)
CREAT SERPL-MCNC: 0.8 MG/DL (ref 0.7–1.2)
GFR SERPL CREATININE-BSD FRML MDRD: >60 ML/MIN/1.73
GLUCOSE BLD-MCNC: 107 MG/DL (ref 74–99)
HAV IGM SER IA-ACNC: NORMAL
HEPATITIS B CORE IGM ANTIBODY: NORMAL
HEPATITIS B SURFACE ANTIGEN INTERPRETATION: NORMAL
HEPATITIS C ANTIBODY INTERPRETATION: NORMAL
POTASSIUM REFLEX MAGNESIUM: 4.2 MMOL/L (ref 3.5–5)
SODIUM BLD-SCNC: 141 MMOL/L (ref 132–146)
TOTAL PROTEIN: 7 G/DL (ref 6.4–8.3)

## 2022-11-13 PROCEDURE — 36415 COLL VENOUS BLD VENIPUNCTURE: CPT

## 2022-11-13 PROCEDURE — 99232 SBSQ HOSP IP/OBS MODERATE 35: CPT | Performed by: INTERNAL MEDICINE

## 2022-11-13 PROCEDURE — 6360000002 HC RX W HCPCS: Performed by: INTERNAL MEDICINE

## 2022-11-13 PROCEDURE — 6370000000 HC RX 637 (ALT 250 FOR IP): Performed by: INTERNAL MEDICINE

## 2022-11-13 PROCEDURE — 2580000003 HC RX 258: Performed by: INTERNAL MEDICINE

## 2022-11-13 PROCEDURE — 2140000000 HC CCU INTERMEDIATE R&B

## 2022-11-13 PROCEDURE — 80053 COMPREHEN METABOLIC PANEL: CPT

## 2022-11-13 PROCEDURE — 6370000000 HC RX 637 (ALT 250 FOR IP): Performed by: NURSE PRACTITIONER

## 2022-11-13 RX ORDER — ATORVASTATIN CALCIUM 40 MG/1
40 TABLET, FILM COATED ORAL NIGHTLY
Status: DISCONTINUED | OUTPATIENT
Start: 2022-11-13 | End: 2022-11-17 | Stop reason: HOSPADM

## 2022-11-13 RX ORDER — METOPROLOL SUCCINATE 50 MG/1
50 TABLET, EXTENDED RELEASE ORAL ONCE
Status: DISCONTINUED | OUTPATIENT
Start: 2022-11-13 | End: 2022-11-13

## 2022-11-13 RX ADMIN — SACUBITRIL AND VALSARTAN 1 TABLET: 24; 26 TABLET, FILM COATED ORAL at 21:16

## 2022-11-13 RX ADMIN — ASPIRIN 81 MG CHEWABLE TABLET 81 MG: 81 TABLET CHEWABLE at 08:26

## 2022-11-13 RX ADMIN — MAGNESIUM OXIDE 400 MG (241.3 MG MAGNESIUM) TABLET 400 MG: TABLET at 08:26

## 2022-11-13 RX ADMIN — SPIRONOLACTONE 25 MG: 25 TABLET ORAL at 08:26

## 2022-11-13 RX ADMIN — ENOXAPARIN SODIUM 40 MG: 100 INJECTION SUBCUTANEOUS at 08:27

## 2022-11-13 RX ADMIN — METOPROLOL SUCCINATE 25 MG: 25 TABLET, FILM COATED, EXTENDED RELEASE ORAL at 21:16

## 2022-11-13 RX ADMIN — SODIUM CHLORIDE, PRESERVATIVE FREE 10 ML: 5 INJECTION INTRAVENOUS at 08:27

## 2022-11-13 RX ADMIN — SODIUM CHLORIDE, PRESERVATIVE FREE 10 ML: 5 INJECTION INTRAVENOUS at 21:17

## 2022-11-13 RX ADMIN — SACUBITRIL AND VALSARTAN 1 TABLET: 24; 26 TABLET, FILM COATED ORAL at 08:26

## 2022-11-13 RX ADMIN — ATORVASTATIN CALCIUM 40 MG: 40 TABLET, FILM COATED ORAL at 21:16

## 2022-11-13 RX ADMIN — METOPROLOL SUCCINATE 25 MG: 25 TABLET, FILM COATED, EXTENDED RELEASE ORAL at 08:26

## 2022-11-13 ASSESSMENT — PAIN SCALES - GENERAL
PAINLEVEL_OUTOF10: 0

## 2022-11-13 NOTE — PROGRESS NOTES
Inpatient Cardiology Progress note     PATIENT IS BEING FOLLOWED FOR: ventricular tachycardia. Non ischemic cardiomyopathy with severe LV systolic dysfunction    Kathleen Landa is a 78 y.o. male seen in intial consultation by Dr. Louise Shadow 11/11/22     Mr. Janna Aviles is a 78year old gentleman who is previously unknown to our practice; he denies history of coronary disease, heart failure, or arrhythmia. He is from [facility] and is a long distance . He reports feeling weak and having decreased activity tolerance since spring of this year. The night of 11/10/22, he ate [employer] and after waking from a nap, he felt flushed, dizzy, had numbness in his extremities and his head, and had epigastric discomfort. He tried to shower, but was unable to stand long enough. His symptoms lasted about two hours before he called 911. He was found to be in VT and successfully cardioverted, in the ED, then placed on Lidocaine drip and also given magnesium sulfate. Troponin levels were 93>>88>>99 and LFTs were significantly elevated. SUBJECTIVE: Denies CP, epigastric discomfort or SOB. or epigastric. Denies palpitations or lightheadedness  OBJECTIVE: No apparent distress     ROS:  Consist: Denies fevers, chills or night sweats  Heart: Denies chest pain, palpitations, lightheadedness, dizziness or syncope  Lungs: Denies SOB, cough, wheezing, orthopnea or PND  GI: Denies nausea, vomiting or diarrhea    PHYSICAL EXAM:   /77   Pulse 59   Temp 98.2 °F (36.8 °C) (Temporal)   Resp 18   Ht 7' 1\" (2.159 m)   Wt 173 lb (78.5 kg)   SpO2 99%   BMI 16.83 kg/m²    B/P Range last 24 hours: Systolic (03MYA), XEY:227 , Min:106 , CHF:251    Diastolic (86FOW), EDF:50, Min:72, Max:77    CONST: Well developed, well nourished male who appears of stated age. Awake, alert and cooperative.  No apparent distress  HEENT:   Head- Normocephalic, atraumatic   Eyes- Conjunctivae pink, anicteric  Throat- Oral mucosa pink and moist  Neck-  No stridor, trachea midline, no jugular venous distention. No carotid bruit  CHEST: Chest symmetrical and non-tender to palpation. No accessory muscle use or intercostal retractions  RESPIRATORY:  Lung sounds - clear throughout fields   CARDIOVASCULAR:     Heart Inspection- shows no noted pulsations  Heart Palpation- no heaves or thrills; PMI is non-displaced   Heart Ausculation- Regular rate and rhythm, no murmur. No s3, s4 or rub   PV: No lower extremity edema. No varicosities. Pedal pulses palpable, no clubbing or cyanosis. Right radial access site without hematoma and with preserved pulse   ABDOMEN: Soft, non-tender to light palpation. Bowel sounds present. No palpable masses no organomegaly; no abdominal bruit  MS: Good muscle strength and tone. No atrophy or abnormal movements. : Deferred  SKIN: Warm and dry no statis dermatitis or ulcers   NEURO / PSYCH: Oriented to person, place and time. Speech clear and appropriate. Follows all commands.  Pleasant affect       Intake/Output Summary (Last 24 hours) at 11/13/2022 0951  Last data filed at 11/13/2022 5784  Gross per 24 hour   Intake 895.28 ml   Output 3200 ml   Net -2304.72 ml       Weight:   Wt Readings from Last 3 Encounters:   11/13/22 173 lb (78.5 kg)   11/11/22 180 lb (81.6 kg)     Current Inpatient Medications:   atorvastatin  40 mg Oral Nightly    magnesium oxide  400 mg Oral Daily    spironolactone  25 mg Oral Daily    aspirin  81 mg Oral Daily    metoprolol succinate  25 mg Oral BID    sodium chloride flush  5-40 mL IntraVENous 2 times per day    enoxaparin  40 mg SubCUTAneous Daily    sacubitril-valsartan  1 tablet Oral BID       IV Infusions (if any):   sodium chloride      lidocaine 1 mg/min (11/12/22 1938)       DIAGNOSTIC/ LABORATORY DATA:  Labs:   CBC:   Recent Labs     11/11/22  0449 11/12/22  0608   WBC 6.9 7.0   HGB 15.0 14.2   HCT 44.8 41.1    111*     BMP:   Recent Labs     11/12/22  0608 11/13/22  0618    141   K 3.5 4.2   CO2 23 23   BUN 8 10   CREATININE 0.8 0.8   LABGLOM >60 >60   CALCIUM 7.9* 9.0     Mag:   Recent Labs     11/11/22  2156 11/12/22  0608   MG 2.0 1.7     Phos: No results for input(s): PHOS in the last 72 hours. TFT:   Lab Results   Component Value Date    TSH 1.260 11/11/2022    T4FREE 1.17 11/11/2022      HgA1c:   Lab Results   Component Value Date    LABA1C 5.7 (H) 11/11/2022     No results found for: EAG    BNP: No results for input(s): BNP in the last 72 hours. PT/INR:   Recent Labs     11/11/22  0449   PROTIME 12.2   INR 1.1     APTT:No results for input(s): APTT in the last 72 hours. CARDIAC ENZYMES:  Recent Labs     11/11/22  0449 11/11/22  0549 11/11/22  0628   TROPHS 93* 88* 99*     FASTING LIPID PANEL:  Lab Results   Component Value Date/Time    CHOL 116 11/12/2022 06:08 AM    HDL 42 11/12/2022 06:08 AM    LDLCALC 60 11/12/2022 06:08 AM    TRIG 71 11/12/2022 06:08 AM     LIVER PROFILE:  Recent Labs     11/12/22  0608 11/13/22  0618   * 69*   * 184*   LABALBU 3.6 4.0       CXR 11/11/22: No acute disease. 12 lead EKG 11/11/22: SR. Possible left atrial enlargement. Non specific ST changes    Cath 11/11/22:  CONCLUSIONS:  1.  Mild coronary artery disease. a. Left main, no significant disease. b.  LAD, 40-50% ostial vessel narrowing and 40% mid vessel narrowing.  c.  LCX, 40% proximal vessel narrowing after a high first marginal  branch (intermediate ramus branch). d.  RCA, dominant vessel with around 30-40% proximal/mid vessel  narrowing. 2.  Dilated left ventricle with severe generalized hypokinesis with an  estimated ejection fraction of 20-25% (with mild mitral regurgitation). 3.  Mildly elevated left ventricular end-diastolic pressure. Echo 11/12/22:  Summary   Technically limited study. The left ventricle is dilated. Normal left ventricle wall thickness. Ejection fraction is visually estimated at 25%. There is doppler evidence of stage I diastolic dysfunction.    Normal right ventricular size and function. The left atrium is borderline dilated. Mild pulmonary hypertension. Telemetry: SR.    ASSESSMENT:   Ventricular tachycardia s/p cardioversion. No recurrence. On IV Lidocaine   Non ischemic cardiomyopathy with severe LV systolic dysfunction. Elevated Pro BNP but is clinically Eu volemic  Mild CAD  Elevated liver enzymes, trending down  Hypokalemia, supplemented and resolved        PLAN:  Continue current cardiac medications. VT management, IV lidocaine, Cardiac MRI, ICD as per EP  Home when OK with others  Cardiology will sign off.  Please call if needed      Electronically signed by Rabia Dumont MD on 11/13/2022 at 9:51 AM

## 2022-11-13 NOTE — PROGRESS NOTES
[address] and [address] Electrophysiology  Inpatient Progress Note  PATIENT: [address] RECORD NUMBER: [id number]  DATE OF SERVICE:  11/13/2022  ATTENDING ELECTROPHYSIOLOGIST: Shireen Boland MD  PRIMARY ELECTROPHYSIOLOGIST: Latanya Franklin  REFERRING PHYSICIAN: Candice Nguyen MD and No primary care provider on file. CHIEF COMPLAINT: Palpitations and near syncope    HPI: This is a 78 y.o. male with no significant prior medical or cardiac history, a resident of [facility] and traveling through this area with his truck who presents to the hospital with sustained monomorphic VT. The patient has not had any regular medical care although he says he has a primary care physician in [facility]. He is on no medications at home. He has noted symptoms of lightheadedness and fatigue in the recent months but since he could not retire he continued to work as a  and was traveling through this area. After his evening meal the patient rested in his truck and was awakened with acute diaphoresis and palpitations. The patient says he was \"soaked\" when he awakened but did not seek help for almost 2 hours. Finally when he realized he could not walk he decided to call EMS and was brought to the emergency room. He was cardioverted in the emergency room and transferred to the Cath Lab for urgent coronary angiography. This did not show any need for revascularization and he was referred to cardiac electrophysiology. Cardiac electrophysiology service is consulted for sustained monomorphic VT and LV dysfunction. 11/12/22: And denies any complaints. No chest pain or shortness of breath. No symptoms of lightheadedness    11/13/22: No new complaints today. No chest pain or shortness of breath.   Ambulating without difficulty    Patient Active Problem List    Diagnosis Date Noted    Ventricular tachycardia 11/11/2022     Priority: Medium    CAD in native artery 11/11/2022 Priority: Medium    Nonischemic cardiomyopathy ([facility] [address] 11/11/2022     Priority: Medium    Lightheadedness 11/11/2022     Priority: Medium    Elevated transaminase level 11/11/2022     Priority: Medium       History reviewed. No pertinent past medical history. History reviewed. No pertinent family history.     Social History     Tobacco Use    Smoking status: Former     Types: Cigarettes    Smokeless tobacco: Not on file   Substance Use Topics    Alcohol use: Not on file       Current Facility-Administered Medications   Medication Dose Route Frequency Provider Last Rate Last Admin    atorvastatin (LIPITOR) tablet 40 mg  40 mg Oral Nightly Riri D Debbie Garcia APRN - NP        magnesium oxide (MAG-OX) tablet 400 mg  400 mg Oral Daily Cici Oglesby MD   400 mg at 11/13/22 0826    spironolactone (ALDACTONE) tablet 25 mg  25 mg Oral Daily Lory Wilson MD   25 mg at 11/13/22 6495    aspirin chewable tablet 81 mg  81 mg Oral Daily Lory Wilson MD   81 mg at 11/13/22 0497    metoprolol succinate (TOPROL XL) extended release tablet 25 mg  25 mg Oral BID Lory Wilson MD   25 mg at 11/13/22 0826    sodium chloride flush 0.9 % injection 5-40 mL  5-40 mL IntraVENous 2 times per day Lory Wilson MD   10 mL at 11/13/22 0827    sodium chloride flush 0.9 % injection 5-40 mL  5-40 mL IntraVENous PRN Lory Wilson MD        0.9 % sodium chloride infusion   IntraVENous PRN Lory Wilson MD        acetaminophen (TYLENOL) tablet 650 mg  650 mg Oral Q4H PRN Lory Wilson MD   650 mg at 11/12/22 0743    perflutren lipid microspheres (DEFINITY) injection 1.5 mL  1.5 mL IntraVENous ONCE PRN Lory Wilson MD        enoxaparin (LOVENOX) injection 40 mg  40 mg SubCUTAneous Daily Lory Wilson MD   40 mg at 11/13/22 0827    sacubitril-valsartan (ENTRESTO) 24-26 MG per tablet 1 tablet  1 tablet Oral BID Lory Wilson MD   1 tablet at 11/13/22 0826        No Known Allergies    ROS:   Constitutional: Negative for fever, activity change and appetite change. HENT: Negative for epistaxis. Eyes: Negative for diploplia, blurred vision. Respiratory: Negative for cough, chest tightness, shortness of breath and wheezing. Cardiovascular: pertinent positives in HPI  Gastrointestinal: Negative for abdominal pain and blood in stool. All other review of systems are negative     PHYSICAL EXAM:   Vitals:    11/12/22 2310 11/13/22 0317 11/13/22 0605 11/13/22 0945   BP: 106/72 128/77  116/71   Pulse: 60 59  59   Resp: 17 18  18   Temp: 97.5 °F (36.4 °C) 98.2 °F (36.8 °C)  97.7 °F (36.5 °C)   TempSrc: Temporal Temporal  Temporal   SpO2: 96% 99%  96%   Weight:   173 lb (78.5 kg)    Height:          Constitutional: Well-developed, no acute distress  Eyes: conjunctivae normal, no xanthelasma   Ears, Nose, Throat: oral mucosa moist, no cyanosis   CV: no JVD, no leg edema, laterally displaced PMI, normal S1 and S2 with left sternal border and apex  Lungs: clear to auscultation bilaterally, normal respiratory effort without used of accessory muscles  Abdomen: soft, non-tender, bowel sounds present, no masses or hepatomegaly   Musculoskeletal: no digital clubbing, no edema   Skin: warm, no rashes     I have personally reviewed the laboratory, cardiac diagnostic and radiographic testing as outlined below:    Data:    Recent Labs     11/11/22 0449 11/12/22 0608   WBC 6.9 7.0   HGB 15.0 14.2   HCT 44.8 41.1    111*     Recent Labs     11/11/22 0628 11/11/22 2156 11/12/22  0608 11/13/22  0618     --  138 141   K 3.2* 3.6 3.5 4.2     --  104 105   CO2 22  --  23 23   BUN 24*  --  8 10   CREATININE 0.9  --  0.8 0.8   CALCIUM 8.2*  --  7.9* 9.0      Lab Results   Component Value Date/Time    MG 1.7 11/12/2022 06:08 AM     Recent Labs     11/11/22 0628   TSH 1.260     Recent Labs     11/11/22 0449   INR 1.1       CXR:   FINDINGS:   Normal cardiomediastinal silhouette. Lungs clear. No pneumothorax or   effusion. Osseous thorax intact. Impression   No acute disease. RECOMMENDATION:   Careful clinical correlation and follow up recommended. Telemetry: Sinus rhythm    EK22 Sinus rhythm   SM VT on presentation: See below      Echocardiogram: Final results pending    Cardiac Catheterization: 22  CONCLUSIONS:  1.  Mild coronary artery disease. a. Left main, no significant disease. b.  LAD, 40-50% ostial vessel narrowing and 40% mid vessel narrowing.  c.  LCX, 40% proximal vessel narrowing after a high first marginal  branch (intermediate ramus branch). d.  RCA, dominant vessel with around 30-40% proximal/mid vessel  narrowing. 2.  Dilated left ventricle with severe generalized hypokinesis with an  estimated ejection fraction of 20-25% (with mild mitral regurgitation). 3.  Mildly elevated left ventricular end-diastolic pressure. Vicky Hebert MD     D: 2022 14:52:18             I have independently reviewed all of the ECGs and rhythm strips per above     Assessment/Plan: This is a 78 y.o. male with a history of     1. Sustained monomorphic VT--right bundle, inferior axis morphology, cycle length 280- 320 ms  Urgent cardioversion in the emergency room. Now on Toprol-XL 25 mg twice a day    2. Coronary artery disease--mild to moderate  Coronary angiography results as noted above    3. Severe LV dysfunction/HFrEF  LVEF of 20-25% noted on cardiac catheterization. Patient denies any prior history of heart failure symptoms. No prior history of hypertension or any major viral illnesses in the recent past therefore etiology is unclear  Patient placed on GDMT--Toprol and Entresto    4.    Abnormal liver function test possibly related to persistent hypotension  prior to presentation to the hospital.      Recommendations:    Cardiac MRI to further delineate the etiology of his structural heart disease--early tomorrow morning  Agree with current medical therapy including Toprol , Entresto and Aldactone  Stop lidocaine  ICD implantation prior to discharge. Discussed with patient. I also spoke to his primary care physician in [facility] about his medical issues after obtaining permission from the patient who requested that I call. The patient participated in shared decision-making for ICD implantation. The procedure was described in detail. The risks, benefits, and alternatives to ICD implantation and possible defibrillation threshold testing were discussed with the patient. These risks include but are not limited to bleeding, infection, blood clot, pneumothorax, hemothorax, cardiac valve damage, cardiac perforation and tamponade required emergent thoracotomy, contrast induced nephropathy leading to short or even long term dialysis, vascular injury requiring emergent surgical repair, lead dislodgement required reposition, stroke and even death. The patient understands these risks and wishes to proceed with ICD implantation and possible defibrillation threshold testing. I have spent a total of 35-45 minutes with the patient reviewing the above stated recommendations. And a total of >50% of that time involved face-to-face time providing counseling and or coordination of care with the other providers, reviewing records/tests, counseling/education of the patient, ordering medications/tests/procedures, coordinating care, and documenting clinical information in the EHR. Thank you for allowing me to participate in your patient's care. Please call me if there are any questions or concerns.       Darrin Mills MD  Cardiac Electrophysiology  [facility]) Physicians  [facility]: [facility]  2:31 PM  11/13/2022

## 2022-11-13 NOTE — PROGRESS NOTES
Hospitalist Progress Note      Synopsis: Patient admitted as a transfer from [facility] for left heart cath. Patient presented to [facility] ED with complaints of lightheadedness. In ED he was found to be in V. tach which was terminated by synchronized cardioversion. Cardiology was consulted and he was placed on lidocaine infusion. He was transferred here for left heart cath. LHC revealed mild coronary artery disease, a dilated left ventricle, and severe generalized hypokinesis with an EF of 20 to 25%. EP was consulted for further evaluation and consideration for ICD implantation. A cardiac MRI was ordered to further evaluate etiology of cardiomyopathy, however, he has metal shrapnel in his eye and we are unable to safely obtain MRI. Plan is for ICD placement Tuesday. Hospital day 2     Subjective:  Stable overnight. No issues reported. Patient seen and examined. Lying in bed. No complaints. Records reviewed. Temp (24hrs), Av.2 °F (36.8 °C), Min:97.5 °F (36.4 °C), Max:98.8 °F (37.1 °C)    DIET: ADULT DIET; Regular; Low Fat/Low Chol/High Fiber/2 gm Na  CODE: Full Code    Intake/Output Summary (Last 24 hours) at 2022 0831  Last data filed at 2022 2291  Gross per 24 hour   Intake 1255.28 ml   Output 3900 ml   Net -2644.72 ml         Review of Systems: All bolded are positive; please see HPI  General:  Fever, chills, diaphoresis, fatigue, malaise, night sweats, weight loss  Psychological:  Anxiety, disorientation, hallucinations. ENT:  Epistaxis, headaches, vertigo, visual changes. Cardiovascular:  Chest pain, irregular heartbeats, palpitations, paroxysmal nocturnal dyspnea. Respiratory:  Shortness of breath, coughing, sputum production, hemoptysis, wheezing, orthopnea.   Gastrointestinal:  Nausea, vomiting, diarrhea, heartburn, constipation, abdominal pain, hematemesis, hematochezia, melena, acholic stools  Genito-Urinary:  Dysuria, urgency, frequency, hematuria  Musculoskeletal: Joint pain, joint stiffness, joint swelling, muscle pain  Neurology:  Headache, focal neurological deficits, weakness, numbness, paresthesia  Derm:  Rashes, ulcers, excoriations, bruising  Extremities:  Decreased ROM, peripheral edema, mottling    Objective:    /77   Pulse 59   Temp 98.2 °F (36.8 °C) (Temporal)   Resp 18   Ht 7' 1\" (2.159 m)   Wt 173 lb (78.5 kg)   SpO2 99%   BMI 16.83 kg/m²     General appearance: Elderly male in no apparent distress, appears stated age and cooperative. HEENT: Conjunctivae/corneas clear. Mucous membranes moist.  Neck: Supple. No JVD. Respiratory:  Clear to auscultation bilaterally. Normal respiratory effort. Cardiovascular:  RRR. S1, S2 without MRG. PV: Pulses palpable. No edema. Abdomen: Soft, non-tender, non-distended. +BS  Musculoskeletal: No obvious deformities. Skin: Normal skin color. No rashes or lesions. Good turgor. Neurologic:  Grossly non-focal. Awake, alert, following commands.    Psychiatric: Alert and oriented, thought content appropriate, normal insight and judgement    Medications:  REVIEWED DAILY    Infusion Medications    sodium chloride      lidocaine 1 mg/min (11/12/22 1938)     Scheduled Medications    magnesium oxide  400 mg Oral Daily    spironolactone  25 mg Oral Daily    aspirin  81 mg Oral Daily    metoprolol succinate  25 mg Oral BID    sodium chloride flush  5-40 mL IntraVENous 2 times per day    enoxaparin  40 mg SubCUTAneous Daily    sacubitril-valsartan  1 tablet Oral BID     PRN Meds: sodium chloride flush, sodium chloride, acetaminophen, perflutren lipid microspheres    Labs:     Recent Labs     11/11/22  0449 11/12/22  0608   WBC 6.9 7.0   HGB 15.0 14.2   HCT 44.8 41.1    111*         Recent Labs     11/11/22  0628 11/11/22  2156 11/12/22  0608 11/13/22  0618     --  138 141   K 3.2* 3.6 3.5 4.2     --  104 105   CO2 22  --  23 23   BUN 24*  --  8 10   CREATININE 0.9  --  0.8 0.8   CALCIUM 8.2*  --  7.9* 9.0         Recent Labs     11/11/22  0449 11/11/22  0628 11/12/22  0608 11/13/22  0618   PROT 6.2* 5.7* 6.1* 7.0   ALKPHOS 103 95 78 85   * 330* 244* 184*   * 309* 144* 69*   BILITOT 0.6 0.4 0.7 0.9   LIPASE 39  --   --   --          Recent Labs     11/11/22 0449   INR 1.1         No results for input(s): Elena Erazo in the last 72 hours. Chronic labs:    Lab Results   Component Value Date    CHOL 116 11/12/2022    TRIG 71 11/12/2022    HDL 42 11/12/2022    LDLCALC 60 11/12/2022    TSH 1.260 11/11/2022    INR 1.1 11/11/2022    LABA1C 5.7 (H) 11/11/2022       Radiology: REVIEWED DAILY    Assessment:  Nonischemic cardiomyopathy-EF 20 to 25%  V. 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    Plan:  Cardiology s/o  EP following - for ICD placementTuesday  Lidocaine stopped  Telemetry monitoring  GDMT-Toprol, Entresto, spironolactone   Statin    DVT Prophylaxis [x] Lovenox  []  Heparin [] DOAC [] PCDs [] Ambulation    GI Prophylaxis [] PPI  [] H2 Blocker   [] Carafate  [x] Diet/Tube Feeds   Level of care [] Med/Surg  [x] Intermediate  []  ICU   Diet ADULT DIET; Regular; Low Fat/Low Chol/High Fiber/2 gm Na    Family contact [x]  N/A    [] At bedside  [] Phone call     Discharge Plan: Home pending ICD placement planned for Tuesday    +++++++++++++++++++++++++++++++++++++++++++++++++  [address], [address]  +++++++++++++++++++++++++++++++++++++++++++++++++  NOTE: This report was transcribed using voice recognition software. Every effort was made to ensure accuracy; however, inadvertent computerized transcription errors may be present.

## 2022-11-14 LAB
ALBUMIN SERPL-MCNC: 3.6 G/DL (ref 3.5–5.2)
ALP BLD-CCNC: 75 U/L (ref 40–129)
ALT SERPL-CCNC: 125 U/L (ref 0–40)
ANION GAP SERPL CALCULATED.3IONS-SCNC: 10 MMOL/L (ref 7–16)
AST SERPL-CCNC: 38 U/L (ref 0–39)
BILIRUB SERPL-MCNC: 0.6 MG/DL (ref 0–1.2)
BUN BLDV-MCNC: 17 MG/DL (ref 6–23)
CALCIUM SERPL-MCNC: 9.3 MG/DL (ref 8.6–10.2)
CHLORIDE BLD-SCNC: 107 MMOL/L (ref 98–107)
CO2: 23 MMOL/L (ref 22–29)
CREAT SERPL-MCNC: 0.9 MG/DL (ref 0.7–1.2)
GFR SERPL CREATININE-BSD FRML MDRD: >60 ML/MIN/1.73
GLUCOSE BLD-MCNC: 93 MG/DL (ref 74–99)
POTASSIUM REFLEX MAGNESIUM: 4.5 MMOL/L (ref 3.5–5)
SODIUM BLD-SCNC: 140 MMOL/L (ref 132–146)
TOTAL PROTEIN: 6.4 G/DL (ref 6.4–8.3)

## 2022-11-14 PROCEDURE — 36415 COLL VENOUS BLD VENIPUNCTURE: CPT

## 2022-11-14 PROCEDURE — 6370000000 HC RX 637 (ALT 250 FOR IP): Performed by: INTERNAL MEDICINE

## 2022-11-14 PROCEDURE — 2580000003 HC RX 258: Performed by: INTERNAL MEDICINE

## 2022-11-14 PROCEDURE — 6360000002 HC RX W HCPCS: Performed by: INTERNAL MEDICINE

## 2022-11-14 PROCEDURE — 80053 COMPREHEN METABOLIC PANEL: CPT

## 2022-11-14 PROCEDURE — 99233 SBSQ HOSP IP/OBS HIGH 50: CPT | Performed by: INTERNAL MEDICINE

## 2022-11-14 PROCEDURE — 6370000000 HC RX 637 (ALT 250 FOR IP): Performed by: NURSE PRACTITIONER

## 2022-11-14 PROCEDURE — 2140000000 HC CCU INTERMEDIATE R&B

## 2022-11-14 RX ADMIN — METOPROLOL SUCCINATE 25 MG: 25 TABLET, FILM COATED, EXTENDED RELEASE ORAL at 09:05

## 2022-11-14 RX ADMIN — SACUBITRIL AND VALSARTAN 1 TABLET: 24; 26 TABLET, FILM COATED ORAL at 21:22

## 2022-11-14 RX ADMIN — MAGNESIUM OXIDE 400 MG (241.3 MG MAGNESIUM) TABLET 400 MG: TABLET at 09:05

## 2022-11-14 RX ADMIN — ASPIRIN 81 MG CHEWABLE TABLET 81 MG: 81 TABLET CHEWABLE at 09:05

## 2022-11-14 RX ADMIN — METOPROLOL SUCCINATE 25 MG: 25 TABLET, FILM COATED, EXTENDED RELEASE ORAL at 21:22

## 2022-11-14 RX ADMIN — SPIRONOLACTONE 25 MG: 25 TABLET ORAL at 09:05

## 2022-11-14 RX ADMIN — SACUBITRIL AND VALSARTAN 1 TABLET: 24; 26 TABLET, FILM COATED ORAL at 09:05

## 2022-11-14 RX ADMIN — SODIUM CHLORIDE, PRESERVATIVE FREE 10 ML: 5 INJECTION INTRAVENOUS at 21:22

## 2022-11-14 RX ADMIN — ENOXAPARIN SODIUM 40 MG: 100 INJECTION SUBCUTANEOUS at 09:04

## 2022-11-14 RX ADMIN — SODIUM CHLORIDE, PRESERVATIVE FREE 10 ML: 5 INJECTION INTRAVENOUS at 09:04

## 2022-11-14 RX ADMIN — ATORVASTATIN CALCIUM 40 MG: 40 TABLET, FILM COATED ORAL at 21:22

## 2022-11-14 ASSESSMENT — PAIN SCALES - GENERAL
PAINLEVEL_OUTOF10: 0
PAINLEVEL_OUTOF10: 0
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
## 2022-11-14 NOTE — PLAN OF CARE
Patient's chart updated to reflect:      . - HF care plan, HF education points and HF discharge instructions.  -Orders: 2 gram sodium diet, daily weights, I/O.  -PCP and cardiology follow up appointments to be scheduled within 7 days of hospital discharge. -CHF education session will be provided to the patient prior to hospital discharge.     Harry Garcia RN RN, BSN  Heart Failure Navigator

## 2022-11-14 NOTE — DISCHARGE INSTRUCTIONS
HEART FAILURE  / CONGESTIVE HEART FAILURE  DISCHARGE INSTRUCTIONS:  GUIDELINES TO FOLLOW AT HOME    Self- Managed Care:     MEDICATIONS:  Take your medication as directed. If you are experiencing any side effects, inform your doctor, Do not stop taking any of your medications without letting your doctor know. Check with your doctor before taking any over-the-counter medications / herbal / or dietary supplements. They may interfere with your other medications. Do not take ibuprofen (Advil or Motrin) and naproxen (Aleve) without talking to your doctor first. They could make your heart failure worse. WEIGHT MONITORING:   Weigh yourself everyday (with the same scale) around the same time of the day and write it down. (you can chart them on a calendar or keep track of them on paper. Notify your doctor of a weight gain of 3 pounds or more in 1 day   OR a total of 5 pounds or more in 1 week    Take your weight record to your doctor visits  Also, the same goes if you loose more than 3# in one day, let your heart doctor know. DIET:   Cardiac heart healthy diet- Low saturated / low trans fat, no added salt, caffeine restricted, Low sodium diet-   No more than 2,000mg (2 grams) of salt / sodium per day (which equals to a little less than  a teaspoon of salt)  If your doctor wants you on a fluid restriction. ..it is usually recommended a fluid limit of 2,000cc -  Fluid restriction- 2,000 ml (milliliters) = 64 ounces = you can have 8 glasses of fluid per day (each glass 8 ounces)    Follow a low salt diet - avoid using salt at the table, avoid / limit use of canned soups, processed / packaged foods, salted snacks, olives and pickles. Do not use a salt substitute without checking with your doctor, they may contain a high amount of potassioum. (Mrs. Banda Her is safe to use).     Limit the use of alcohol       CALL YOUR DOCTOR THE FIRST DAY YOU NOTICE ANY OF THESE   SYMPTOMS:  You have a weight gain of 3 pounds or more in 1 day         OR 5 pounds or more in one week  More shortness of breath  More swelling of your stomach, legs, ankles or feet  Feeling more tired, No energy  Dry hacky cough  Dizziness  More chest pain / discomfort       (CALL 911 IF ANY OF THE FOLLOWING OCCURS  Chest pain (not relieved with nitroglycerine, if you have been prescribed this medication)  Severe shortness of breath  Faint / Pass out  Confusion / cannot think clearly  If symptoms get worse           SMOKING - TOBACCO USE:  * IF YOU SMOKE - STOP! Kick the habit. [address] Program is offered at [facility]  and [address] [facility]. Call [phone] extension [phone] for more information. ACTIVITY:   (Ask your doctor when you will be able to return to work and before starting any exercise program.  Do not drive unless unless your doctor has given you permission to do so). Start light exercise. Even if you can only do a small amount, exercise will help you get stronger, have more energy, help manage your weight and decrease  stress. Walking is an easy way to get exercise. Start out slowly and  increase the amount you walk as tolerated  If you become short of breath, dizzy or have chest pain; stop, sit down, and rest.  If you feel \"wiped out\" the day after you exercise, walk at a slower pace or for a shorter distance. You can gradually increase the pace or amount of time. (Do not exercise right after a meal or in extreme temperatures, such as above 85 degrees, if the air is really humid, or wind chill is less than 20 degrees)                                             ADDITIONAL INFORMATION:  Avoid getting sick from colds and the flu. Stay away from friends or family that you know may have a contagious illness  Get plenty of rest   Get a flu shot each year. Get a pneumococcal vaccine shot.  If you have had one before, ask your doctor whether you need another dose. My Goal for Self-management of Heart Failure Includes 5 steps :    1. Notice a change in symptoms ( weight gain, short of breath, leg swelling, decreased activity level, bloating. ...)    2. Evaluate the change: (use the Heart Failure Zones )     3. Decide to take action: decide what your options are, such as: (call your doctor for an extra visit, take a prescribed medication, such as your water pill if your doctor has given you directions to do so, [address])    4. Come up with a strategy:  (now you call the doctor for advice / appointment. This is where you take action!!! Do not wait, catch the symptom early and treat it before it worsens. 5. Evaluate the response: The next day, check your Heart Failure Zones: are you in the GREEN ZONE (safe zone)? Worsening symptoms of YELLOW ZONE? Or have you moved to the RED ZONE and need to call 911 or go to the Emergency Room for evaluation? Call your doctor's office to update them on your symptoms of heart failure. [facility]) Electrophysiology ICD Discharge Instructions    Incision Care:  Leave brown Aquacel dressing on for 1 week. Remove aquacel dressing on Tuesday 11/22/22. Do not pick at or remove skin glue from your incision. It will wear off over the next few weeks. Keep the incision dry. You may shower; but do not let the water run directly on the incision for 1 week. Check the area daily. Notify the office at  [phone] or [phone] if you develop any redness, swelling, drainage, warmth, or fever greater than 100 degrees. Activity:  You may continue regular activity; but limit strenuous or stretching movements of the arm closest to your ICD. Wear the arm immobilizer at all times for 48 hours and then at night for 4 weeks. Avoid pulling yourself up with that arm. Do not raise that elbow higher than shoulder height and do not lift anything weighing more than 2 pounds for 4 weeks.     Do not do any activities such as golfing, vacuuming, or mowing the lawn for 4 weeks. Prevent any hard blows to the ICD. Driving:  NO driving until cleared by your electrophysiologist    Possible Defibrillator Interferences:  Avoid high frequency ham radios, arc welders, battery powered tools, and strong electromagnetic fields. Avoid any device that may electrically stimulate your body; such as electric muscle stimulators or TENS units. Standing over a running car motor with the dumont up may inhibit the ICD. When using a cell phone, use the ear opposite the side of your ICD if possible    Special Instructions:  Let your dentist, doctor, or medical specialist know that you have an ICD so precautions can be taken to protect the defibrillator. You should NOT have an MRI if have defibrillator unless instructed by your physician. Your defibrillator may set off a metal detector, such as those used in airports and courtrooms. Let security know that you have an ICD & show them your card. ID Card: You will have a temporary ID card until a permanent card is sent to you by the device company. The permanent card will look like a 's license or credit card and should arrive within 8 weeks. Carry your ID card with you at all times      What else do I need to know about my pacemaker or defibrillator? Do not carry a cellular phone in a pocket within six inches of the pacemaker or defibrillator as this can affect the operation of the unit. Hold the cell phone on the opposite ear as the defibrillator or pacemaker insertion site. Do not walk through airport security systems as these devices can detect the metal in your pacemaker or defibrillator and possibly alarm. The new full body scanners are safe for both pacemakers and defibrillators. Keep your pacemaker/defibrillator ID card with you at all times and ask security to clear you with a hand search only if a full body scanner is not available.   Do not let security use a hand-held wand. Avoid passing through metal detectors (for example in shopping malls and schools). If you must pass through, walk quickly through. These detectors can interfere with the pacemaker and defibrillator function. Do not touch the spark plug or distributor on running car or  - turn engine off first.  Avoid holding magnets near your pacemaker or defibrillator. ICD Shock: If your defibrillator gives you a shock, please notify the office. If you are shocked more than once in a day or several times in a week, go the emergency room. Remote Monitor: Many of these monitors have a delay of 3-6 months currently, in some cases an mera can be used with newer smartphones to provide monitoring services. Please discuss this with the device clinic at your follow up appointment in 2 weeks. Follow Up: You will be seen on Monday, 11/28/22 at 9:30 am  at the [address] [place] [address] for  incision check and brief ICD evaluation. If you need the reschedule this appointment, call the office at [phone] or [phone].       [place] [facility]:     [address] [place] [address]     [phone] or [phone]

## 2022-11-14 NOTE — CARE COORDINATION
Transition of care: EP cardio following - plan for single chamber ICD tomorrow. Met with pt in room. Pt lives alone in a 2 story home in [place]. He was born in [place]. Independent with ADLs and drives. Pt is a  for [employer]. Pt said [employer] is picking up his truck. No DME. Plan is return home in [place] at discharge. He is asking a friend for transportation home. I told him to notify sw/cm when he has confirmed his friend for transportation. Social work consult for cost of medications. Unable to check cost of meds until scripts are sent to the pharmacy. I provided him with info on [facility] to call to see if he is able to get assistance with his co-pay and a free 30 day coupon. PCP is Dr. Barb Du. Pt does not have a set pharmacy. Sw/cm will follow.

## 2022-11-15 ENCOUNTER — ANESTHESIA (OUTPATIENT)
Dept: CARDIAC CATH/INVASIVE PROCEDURES | Age: 67
DRG: 179 | End: 2022-11-15
Payer: COMMERCIAL

## 2022-11-15 ENCOUNTER — ANESTHESIA EVENT (OUTPATIENT)
Dept: CARDIAC CATH/INVASIVE PROCEDURES | Age: 67
DRG: 179 | End: 2022-11-15
Payer: COMMERCIAL

## 2022-11-15 ENCOUNTER — APPOINTMENT (OUTPATIENT)
Dept: GENERAL RADIOLOGY | Age: 67
DRG: 179 | End: 2022-11-15
Attending: INTERNAL MEDICINE
Payer: COMMERCIAL

## 2022-11-15 ENCOUNTER — APPOINTMENT (OUTPATIENT)
Dept: CARDIAC CATH/INVASIVE PROCEDURES | Age: 67
DRG: 179 | End: 2022-11-15
Attending: INTERNAL MEDICINE
Payer: COMMERCIAL

## 2022-11-15 LAB
ALBUMIN SERPL-MCNC: 3.7 G/DL (ref 3.5–5.2)
ALBUMIN SERPL-MCNC: 3.7 G/DL (ref 3.5–5.2)
ALP BLD-CCNC: 71 U/L (ref 40–129)
ALP BLD-CCNC: 73 U/L (ref 40–129)
ALT SERPL-CCNC: 89 U/L (ref 0–40)
ALT SERPL-CCNC: 93 U/L (ref 0–40)
ANION GAP SERPL CALCULATED.3IONS-SCNC: 10 MMOL/L (ref 7–16)
ANION GAP SERPL CALCULATED.3IONS-SCNC: 14 MMOL/L (ref 7–16)
AST SERPL-CCNC: 27 U/L (ref 0–39)
AST SERPL-CCNC: 28 U/L (ref 0–39)
BASOPHILS ABSOLUTE: 0.04 E9/L (ref 0–0.2)
BASOPHILS RELATIVE PERCENT: 0.7 % (ref 0–2)
BILIRUB SERPL-MCNC: 0.5 MG/DL (ref 0–1.2)
BILIRUB SERPL-MCNC: 0.6 MG/DL (ref 0–1.2)
BUN BLDV-MCNC: 19 MG/DL (ref 6–23)
BUN BLDV-MCNC: 21 MG/DL (ref 6–23)
CALCIUM SERPL-MCNC: 9.2 MG/DL (ref 8.6–10.2)
CALCIUM SERPL-MCNC: 9.2 MG/DL (ref 8.6–10.2)
CHLORIDE BLD-SCNC: 104 MMOL/L (ref 98–107)
CHLORIDE BLD-SCNC: 106 MMOL/L (ref 98–107)
CO2: 21 MMOL/L (ref 22–29)
CO2: 23 MMOL/L (ref 22–29)
CREAT SERPL-MCNC: 0.9 MG/DL (ref 0.7–1.2)
CREAT SERPL-MCNC: 0.9 MG/DL (ref 0.7–1.2)
EOSINOPHILS ABSOLUTE: 0.13 E9/L (ref 0.05–0.5)
EOSINOPHILS RELATIVE PERCENT: 2.2 % (ref 0–6)
GFR SERPL CREATININE-BSD FRML MDRD: >60 ML/MIN/1.73
GFR SERPL CREATININE-BSD FRML MDRD: >60 ML/MIN/1.73
GLUCOSE BLD-MCNC: 111 MG/DL (ref 74–99)
GLUCOSE BLD-MCNC: 99 MG/DL (ref 74–99)
HCT VFR BLD CALC: 48.1 % (ref 37–54)
HEMOGLOBIN: 16.3 G/DL (ref 12.5–16.5)
IMMATURE GRANULOCYTES #: 0.04 E9/L
IMMATURE GRANULOCYTES %: 0.7 % (ref 0–5)
LYMPHOCYTES ABSOLUTE: 2.06 E9/L (ref 1.5–4)
LYMPHOCYTES RELATIVE PERCENT: 34.2 % (ref 20–42)
MCH RBC QN AUTO: 29.7 PG (ref 26–35)
MCHC RBC AUTO-ENTMCNC: 33.9 % (ref 32–34.5)
MCV RBC AUTO: 87.6 FL (ref 80–99.9)
MONOCYTES ABSOLUTE: 0.72 E9/L (ref 0.1–0.95)
MONOCYTES RELATIVE PERCENT: 12 % (ref 2–12)
NEUTROPHILS ABSOLUTE: 3.03 E9/L (ref 1.8–7.3)
NEUTROPHILS RELATIVE PERCENT: 50.2 % (ref 43–80)
PDW BLD-RTO: 12.7 FL (ref 11.5–15)
PLATELET # BLD: 172 E9/L (ref 130–450)
PMV BLD AUTO: 12.6 FL (ref 7–12)
POTASSIUM REFLEX MAGNESIUM: 4.4 MMOL/L (ref 3.5–5)
POTASSIUM REFLEX MAGNESIUM: 4.5 MMOL/L (ref 3.5–5)
RBC # BLD: 5.49 E12/L (ref 3.8–5.8)
SODIUM BLD-SCNC: 137 MMOL/L (ref 132–146)
SODIUM BLD-SCNC: 141 MMOL/L (ref 132–146)
TOTAL PROTEIN: 6.5 G/DL (ref 6.4–8.3)
TOTAL PROTEIN: 6.5 G/DL (ref 6.4–8.3)
WBC # BLD: 6 E9/L (ref 4.5–11.5)

## 2022-11-15 PROCEDURE — 6370000000 HC RX 637 (ALT 250 FOR IP): Performed by: INTERNAL MEDICINE

## 2022-11-15 PROCEDURE — 80053 COMPREHEN METABOLIC PANEL: CPT

## 2022-11-15 PROCEDURE — 2580000003 HC RX 258: Performed by: INTERNAL MEDICINE

## 2022-11-15 PROCEDURE — 2709999900 HC NON-CHARGEABLE SUPPLY

## 2022-11-15 PROCEDURE — 71045 X-RAY EXAM CHEST 1 VIEW: CPT

## 2022-11-15 PROCEDURE — 2580000003 HC RX 258: Performed by: STUDENT IN AN ORGANIZED HEALTH CARE EDUCATION/TRAINING PROGRAM

## 2022-11-15 PROCEDURE — 93005 ELECTROCARDIOGRAM TRACING: CPT | Performed by: STUDENT IN AN ORGANIZED HEALTH CARE EDUCATION/TRAINING PROGRAM

## 2022-11-15 PROCEDURE — C1894 INTRO/SHEATH, NON-LASER: HCPCS

## 2022-11-15 PROCEDURE — 0JH608Z INSERTION OF DEFIBRILLATOR GENERATOR INTO CHEST SUBCUTANEOUS TISSUE AND FASCIA, OPEN APPROACH: ICD-10-PCS | Performed by: STUDENT IN AN ORGANIZED HEALTH CARE EDUCATION/TRAINING PROGRAM

## 2022-11-15 PROCEDURE — 2580000003 HC RX 258

## 2022-11-15 PROCEDURE — 6360000002 HC RX W HCPCS

## 2022-11-15 PROCEDURE — 2140000000 HC CCU INTERMEDIATE R&B

## 2022-11-15 PROCEDURE — 2500000003 HC RX 250 WO HCPCS

## 2022-11-15 PROCEDURE — C1898 LEAD, PMKR, OTHER THAN TRANS: HCPCS

## 2022-11-15 PROCEDURE — 6370000000 HC RX 637 (ALT 250 FOR IP): Performed by: NURSE PRACTITIONER

## 2022-11-15 PROCEDURE — 33249 INSJ/RPLCMT DEFIB W/LEAD(S): CPT | Performed by: STUDENT IN AN ORGANIZED HEALTH CARE EDUCATION/TRAINING PROGRAM

## 2022-11-15 PROCEDURE — 02HK3KZ INSERTION OF DEFIBRILLATOR LEAD INTO RIGHT VENTRICLE, PERCUTANEOUS APPROACH: ICD-10-PCS | Performed by: STUDENT IN AN ORGANIZED HEALTH CARE EDUCATION/TRAINING PROGRAM

## 2022-11-15 PROCEDURE — 2580000003 HC RX 258: Performed by: NURSE ANESTHETIST, CERTIFIED REGISTERED

## 2022-11-15 PROCEDURE — C1777 LEAD, AICD, ENDO SINGLE COIL: HCPCS

## 2022-11-15 PROCEDURE — 2500000003 HC RX 250 WO HCPCS: Performed by: NURSE ANESTHETIST, CERTIFIED REGISTERED

## 2022-11-15 PROCEDURE — 85025 COMPLETE CBC W/AUTO DIFF WBC: CPT

## 2022-11-15 PROCEDURE — 3700000001 HC ADD 15 MINUTES (ANESTHESIA)

## 2022-11-15 PROCEDURE — C1721 AICD, DUAL CHAMBER: HCPCS

## 2022-11-15 PROCEDURE — 3700000000 HC ANESTHESIA ATTENDED CARE

## 2022-11-15 PROCEDURE — 02H63KZ INSERTION OF DEFIBRILLATOR LEAD INTO RIGHT ATRIUM, PERCUTANEOUS APPROACH: ICD-10-PCS | Performed by: STUDENT IN AN ORGANIZED HEALTH CARE EDUCATION/TRAINING PROGRAM

## 2022-11-15 PROCEDURE — 36415 COLL VENOUS BLD VENIPUNCTURE: CPT

## 2022-11-15 PROCEDURE — 6360000002 HC RX W HCPCS: Performed by: NURSE ANESTHETIST, CERTIFIED REGISTERED

## 2022-11-15 PROCEDURE — 6370000000 HC RX 637 (ALT 250 FOR IP): Performed by: STUDENT IN AN ORGANIZED HEALTH CARE EDUCATION/TRAINING PROGRAM

## 2022-11-15 PROCEDURE — C1889 IMPLANT/INSERT DEVICE, NOC: HCPCS

## 2022-11-15 PROCEDURE — 33249 INSJ/RPLCMT DEFIB W/LEAD(S): CPT

## 2022-11-15 PROCEDURE — C1892 INTRO/SHEATH,FIXED,PEEL-AWAY: HCPCS

## 2022-11-15 RX ORDER — VANCOMYCIN HYDROCHLORIDE 1 G/20ML
INJECTION, POWDER, LYOPHILIZED, FOR SOLUTION INTRAVENOUS PRN
Status: DISCONTINUED | OUTPATIENT
Start: 2022-11-15 | End: 2022-11-15 | Stop reason: SDUPTHER

## 2022-11-15 RX ORDER — ACETAMINOPHEN 325 MG/1
650 TABLET ORAL EVERY 4 HOURS PRN
Status: DISCONTINUED | OUTPATIENT
Start: 2022-11-15 | End: 2022-11-17 | Stop reason: HOSPADM

## 2022-11-15 RX ORDER — VASOPRESSIN 20 U/ML
INJECTION PARENTERAL PRN
Status: DISCONTINUED | OUTPATIENT
Start: 2022-11-15 | End: 2022-11-15 | Stop reason: SDUPTHER

## 2022-11-15 RX ORDER — CEFAZOLIN SODIUM 1 G/3ML
INJECTION, POWDER, FOR SOLUTION INTRAMUSCULAR; INTRAVENOUS PRN
Status: DISCONTINUED | OUTPATIENT
Start: 2022-11-15 | End: 2022-11-15 | Stop reason: SDUPTHER

## 2022-11-15 RX ORDER — CALCIUM CHLORIDE 100 MG/ML
INJECTION INTRAVENOUS; INTRAVENTRICULAR PRN
Status: DISCONTINUED | OUTPATIENT
Start: 2022-11-15 | End: 2022-11-15 | Stop reason: SDUPTHER

## 2022-11-15 RX ORDER — SODIUM CHLORIDE 0.9 % (FLUSH) 0.9 %
5-40 SYRINGE (ML) INJECTION EVERY 12 HOURS SCHEDULED
Status: DISCONTINUED | OUTPATIENT
Start: 2022-11-15 | End: 2022-11-17 | Stop reason: HOSPADM

## 2022-11-15 RX ORDER — SODIUM CHLORIDE 9 MG/ML
INJECTION, SOLUTION INTRAVENOUS CONTINUOUS PRN
Status: DISCONTINUED | OUTPATIENT
Start: 2022-11-15 | End: 2022-11-15 | Stop reason: SDUPTHER

## 2022-11-15 RX ORDER — FENTANYL CITRATE 50 UG/ML
INJECTION, SOLUTION INTRAMUSCULAR; INTRAVENOUS PRN
Status: DISCONTINUED | OUTPATIENT
Start: 2022-11-15 | End: 2022-11-15 | Stop reason: SDUPTHER

## 2022-11-15 RX ORDER — SODIUM CHLORIDE 0.9 % (FLUSH) 0.9 %
5-40 SYRINGE (ML) INJECTION PRN
Status: DISCONTINUED | OUTPATIENT
Start: 2022-11-15 | End: 2022-11-17 | Stop reason: HOSPADM

## 2022-11-15 RX ORDER — EPHEDRINE SULFATE/0.9% NACL/PF 50 MG/5 ML
SYRINGE (ML) INTRAVENOUS PRN
Status: DISCONTINUED | OUTPATIENT
Start: 2022-11-15 | End: 2022-11-15 | Stop reason: SDUPTHER

## 2022-11-15 RX ORDER — MIDAZOLAM HYDROCHLORIDE 1 MG/ML
INJECTION INTRAMUSCULAR; INTRAVENOUS PRN
Status: DISCONTINUED | OUTPATIENT
Start: 2022-11-15 | End: 2022-11-15 | Stop reason: SDUPTHER

## 2022-11-15 RX ORDER — PROPOFOL 10 MG/ML
INJECTION, EMULSION INTRAVENOUS PRN
Status: DISCONTINUED | OUTPATIENT
Start: 2022-11-15 | End: 2022-11-15 | Stop reason: SDUPTHER

## 2022-11-15 RX ORDER — SODIUM CHLORIDE 9 MG/ML
INJECTION, SOLUTION INTRAVENOUS PRN
Status: DISCONTINUED | OUTPATIENT
Start: 2022-11-15 | End: 2022-11-17 | Stop reason: HOSPADM

## 2022-11-15 RX ADMIN — VASOPRESSIN 1 UNITS: 20 INJECTION INTRAVENOUS at 15:51

## 2022-11-15 RX ADMIN — SACUBITRIL AND VALSARTAN 1 TABLET: 24; 26 TABLET, FILM COATED ORAL at 09:20

## 2022-11-15 RX ADMIN — SODIUM CHLORIDE, PRESERVATIVE FREE 10 ML: 5 INJECTION INTRAVENOUS at 09:20

## 2022-11-15 RX ADMIN — PROPOFOL 50 MCG/KG/MIN: 10 INJECTION, EMULSION INTRAVENOUS at 15:04

## 2022-11-15 RX ADMIN — METOPROLOL SUCCINATE 25 MG: 25 TABLET, FILM COATED, EXTENDED RELEASE ORAL at 18:15

## 2022-11-15 RX ADMIN — MAGNESIUM OXIDE 400 MG (241.3 MG MAGNESIUM) TABLET 400 MG: TABLET at 09:20

## 2022-11-15 RX ADMIN — VASOPRESSIN 1 UNITS: 20 INJECTION INTRAVENOUS at 16:04

## 2022-11-15 RX ADMIN — Medication 10 MG: at 15:29

## 2022-11-15 RX ADMIN — FENTANYL CITRATE 50 MCG: 50 INJECTION, SOLUTION INTRAMUSCULAR; INTRAVENOUS at 15:37

## 2022-11-15 RX ADMIN — ATORVASTATIN CALCIUM 40 MG: 40 TABLET, FILM COATED ORAL at 21:02

## 2022-11-15 RX ADMIN — Medication 20 MG: at 15:31

## 2022-11-15 RX ADMIN — SODIUM CHLORIDE: 9 INJECTION, SOLUTION INTRAVENOUS at 14:56

## 2022-11-15 RX ADMIN — FENTANYL CITRATE 50 MCG: 50 INJECTION, SOLUTION INTRAMUSCULAR; INTRAVENOUS at 15:09

## 2022-11-15 RX ADMIN — SACUBITRIL AND VALSARTAN 1 TABLET: 24; 26 TABLET, FILM COATED ORAL at 21:02

## 2022-11-15 RX ADMIN — SPIRONOLACTONE 25 MG: 25 TABLET ORAL at 09:20

## 2022-11-15 RX ADMIN — SODIUM CHLORIDE, PRESERVATIVE FREE 10 ML: 5 INJECTION INTRAVENOUS at 21:03

## 2022-11-15 RX ADMIN — MIDAZOLAM 2 MG: 1 INJECTION INTRAMUSCULAR; INTRAVENOUS at 14:59

## 2022-11-15 RX ADMIN — Medication 10 MG: at 15:26

## 2022-11-15 RX ADMIN — PROPOFOL 40 MG: 10 INJECTION, EMULSION INTRAVENOUS at 15:03

## 2022-11-15 RX ADMIN — CALCIUM CHLORIDE 1 G: 100 INJECTION, SOLUTION INTRAVENOUS at 15:34

## 2022-11-15 RX ADMIN — VASOPRESSIN 2 UNITS: 20 INJECTION INTRAVENOUS at 16:15

## 2022-11-15 RX ADMIN — ASPIRIN 81 MG CHEWABLE TABLET 81 MG: 81 TABLET CHEWABLE at 09:20

## 2022-11-15 RX ADMIN — VANCOMYCIN HYDROCHLORIDE 1000 MG: 1 INJECTION, POWDER, LYOPHILIZED, FOR SOLUTION INTRAVENOUS at 15:15

## 2022-11-15 RX ADMIN — Medication 10 MG: at 15:48

## 2022-11-15 RX ADMIN — CEFAZOLIN 2 G: 1 INJECTION, POWDER, FOR SOLUTION INTRAMUSCULAR; INTRAVENOUS at 15:21

## 2022-11-15 RX ADMIN — ACETAMINOPHEN 650 MG: 325 TABLET ORAL at 19:18

## 2022-11-15 RX ADMIN — METOPROLOL SUCCINATE 25 MG: 25 TABLET, FILM COATED, EXTENDED RELEASE ORAL at 21:02

## 2022-11-15 ASSESSMENT — PAIN SCALES - GENERAL: PAINLEVEL_OUTOF10: 0

## 2022-11-15 ASSESSMENT — ENCOUNTER SYMPTOMS: DYSPNEA ACTIVITY LEVEL: AFTER AMBULATING 1 FLIGHT OF STAIRS

## 2022-11-15 ASSESSMENT — LIFESTYLE VARIABLES: SMOKING_STATUS: 0

## 2022-11-15 NOTE — PROGRESS NOTES
Hospitalist Progress Note      Synopsis: Patient admitted as a transfer from [facility] for left heart cath. Patient presented to [facility] ED with complaints of lightheadedness. In ED he was found to be in V. tach which was terminated by synchronized cardioversion. Cardiology was consulted and he was placed on lidocaine infusion. He was transferred here for left heart cath. LHC revealed mild coronary artery disease, a dilated left ventricle, and severe generalized hypokinesis with an EF of 20 to 25%. EP was consulted for further evaluation and consideration for ICD implantation. A cardiac MRI was ordered to further evaluate etiology of cardiomyopathy, however, he has metal shrapnel in his eye and we are unable to safely obtain MRI. Plan is for ICD placement Tuesday. Hospital day 4     Subjective:  Stable overnight. No issues reported. Patient seen and examined. Lying in bed. No complaints. Records reviewed. Temp (24hrs), Av.8 °F (36.6 °C), Min:97.2 °F (36.2 °C), Max:98.8 °F (37.1 °C)    DIET: Diet NPO  CODE: Full Code    Intake/Output Summary (Last 24 hours) at 11/15/2022 1645  Last data filed at 11/15/2022 1644  Gross per 24 hour   Intake 990 ml   Output 1500 ml   Net -510 ml         Review of Systems: All bolded are positive; please see HPI  General:  Fever, chills, diaphoresis, fatigue, malaise, night sweats, weight loss  Psychological:  Anxiety, disorientation, hallucinations. ENT:  Epistaxis, headaches, vertigo, visual changes. Cardiovascular:  Chest pain, irregular heartbeats, palpitations, paroxysmal nocturnal dyspnea. Respiratory:  Shortness of breath, coughing, sputum production, hemoptysis, wheezing, orthopnea.   Gastrointestinal:  Nausea, vomiting, diarrhea, heartburn, constipation, abdominal pain, hematemesis, hematochezia, melena, acholic stools  Genito-Urinary:  Dysuria, urgency, frequency, hematuria  Musculoskeletal:  Joint pain, joint stiffness, joint swelling, muscle pain  Neurology:  Headache, focal neurological deficits, weakness, numbness, paresthesia  Derm:  Rashes, ulcers, excoriations, bruising  Extremities:  Decreased ROM, peripheral edema, mottling    Objective:    /66   Pulse 61   Temp 97.3 °F (36.3 °C)   Resp 20   Ht 5' 11\" (1.803 m)   Wt 170 lb (77.1 kg)   SpO2 96%   BMI 23.71 kg/m²     General appearance: Elderly male in no apparent distress, appears stated age and cooperative. HEENT: Conjunctivae/corneas clear. Mucous membranes moist.  Neck: Supple. No JVD. Respiratory:  Clear to auscultation bilaterally. Normal respiratory effort. Cardiovascular:  RRR. S1, S2 without MRG. PV: Pulses palpable. No edema. Abdomen: Soft, non-tender, non-distended. +BS  Musculoskeletal: No obvious deformities. Skin: Normal skin color. No rashes or lesions. Good turgor. Neurologic:  Grossly non-focal. Awake, alert, following commands.    Psychiatric: Alert and oriented, thought content appropriate, normal insight and judgement    Medications:  REVIEWED DAILY    Infusion Medications    sodium chloride       Scheduled Medications    atorvastatin  40 mg Oral Nightly    magnesium oxide  400 mg Oral Daily    spironolactone  25 mg Oral Daily    aspirin  81 mg Oral Daily    metoprolol succinate  25 mg Oral BID    sodium chloride flush  5-40 mL IntraVENous 2 times per day    sacubitril-valsartan  1 tablet Oral BID     PRN Meds: sodium chloride flush, sodium chloride, acetaminophen, perflutren lipid microspheres    Labs:     Recent Labs     11/15/22  0901   WBC 6.0   HGB 16.3   HCT 48.1            Recent Labs     11/14/22  0420 11/15/22  0518 11/15/22  0901    141 137   K 4.5 4.4 4.5    106 104   CO2 23 21* 23   BUN 17 21 19   CREATININE 0.9 0.9 0.9   CALCIUM 9.3 9.2 9.2         Recent Labs     11/14/22  0420 11/15/22  0518 11/15/22  0901   PROT 6.4 6.5 6.5   ALKPHOS 75 71 73   * 93* 89*   AST 38 28 27   BILITOT 0.6 0.5 0.6         No results for input(s): INR in the last 72 hours. No results for input(s): Raj Ferguson in the last 72 hours. Chronic labs:    Lab Results   Component Value Date    CHOL 116 11/12/2022    TRIG 71 11/12/2022    HDL 42 11/12/2022    LDLCALC 60 11/12/2022    TSH 1.260 11/11/2022    INR 1.1 11/11/2022    LABA1C 5.7 (H) 11/11/2022       Radiology: REVIEWED DAILY    Assessment:  Nonischemic cardiomyopathy-EF 20 to 25%  V. 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 Discharge will be complicated by driving restriction as patient is a  who lives in [facility] and is in from out of town. Will reach out to        +++++++++++++++++++++++++++++++++++++++++++++++++  Annette Callowayplatterrell 70, [address]  +++++++++++++++++++++++++++++++++++++++++++++++++  NOTE: This report was transcribed using voice recognition software. Every effort was made to ensure accuracy; however, inadvertent computerized transcription errors may be present.

## 2022-11-15 NOTE — OP NOTE
Operative Note      Patient: Briseyda Chandler  YOB: 1955  MRN: [id number]    Date of Procedure: 11/15/2022    Pre-Op Diagnosis: NICM, MMVT, chronotropic incompetence    Post-Op Diagnosis: same       Procedure: Medtronic dual chamber ICD implant (CPT code: 28028)     Surgeon: Ari Meyer DO     Assistant: None     Anesthesia: see separate Anesthesia report     Estimated Blood Loss (mL): 10 mL     Complications: none     Detailed Description of Procedure:   Verbal and written informed consent obtained from the patient and family. The patient was brought to the EP lab in a fasting state. Monitored anesthesia care was administered by anesthesia provider during the procedure. A venogram with IV contrast confirmed patency and location of left axillary, subclavian veins and SVC. The left upper chest was inspected for hair at the anticipated incision site within the clavipectoral triangle and if present was removed with electric clippers. The site was then prepared with chlorhexidine gluconate and draped in a sterile fashion. Ancef and Vancomycin were administered paraenterally within 1 hour prior to incision. The left upper chest area was inspected for main folding lines and striae to guide orientation of incision and if not apparent a pinch test in various directions was performed to visualize the folding lines. The incision was planned to follow perpendicular to the main folding lines in order to minimize scar formation and optimize wound healing. Local anesthesia with 2% lidocaine HCl was applied to the planned incision and pocket area. Once adequate sedation was achieved and lidocaine was administered, an incision was made two finger-breadths inferior the left clavicle between the mid-clavicular line and deltopectoral groove, which was of adequate width to accommodate the device.  Using blunt dissection and electrocautery, a subcutaneous device pocket was created superficial to the pectoralis major muscle fascia in order to avoid the pain corpuscles of the subcuticular plane and vascular pectoralis major muscle. The pocket was extended in a medial and inferior direction from the pocket incision site. A superior lip to the pocket was also created. Hemostasis was achieved and confirmed. The previously performed venogram was used to guide access in the axillary vein overlying the first rib with a modified Seldinger technique and micro puncture needle under fluoroscopic visualization in PA caudal 35* view  in order to reduce the risk of pneumothorax and hemothorax. Two J tip wires were advanced to the right atrium (RA) and IVC in order to confirm venous puncture and avoid inadvertent placement of pacing leads in the arterial system. A 9 Sinhala sheath was advanced over the the first one. Wire and dilator were removed. A 62 cm 8.6 F Medtronic Sprint Quattro Secure S (model: [id number]) active fixation lead with a soft straight stylet was advanced into the sheath, then the stylet was partially retracted and the lead was advanced into the RA via the sheath. The stylet was removed and a curve was introduced to the distal end of the stylet. The stylet was the reintroduced to the lead. A soft stylet was used to minimize the risk of cardiac perforation and damage the tricuspid valve (TV). The lead was advanced into the right ventricle (RV) at a lower plane in order to avoid complications with the TV due to the chordae on the septal aspect. The lead was advanced into the pulmonary artery while partially retracting the stylet in order to confirm location in the RV and avoid inadvertent placement of the lead in the middle cardiac vein or in the LV through a patent foramen ovale. The stylet was exchanged for soft stylet manually shaped 3D to facilitate placement on apical RV setpum. The lead was then slowly withdrawn until it dropped into the RV cavity in right anterior oblique (COLE) 30* fluoroscopic view.  The lead was positioned on the apical RV septum. Azeri 40* view showed RV lead tip was 0-60* from horizontal. The lead tip was deployed. The EGM was reviewed and current of injury observed, which is associated with adequate threshold and long-term stability. No evidence of perforation (negative ST-segment) was observed. Lead parameters were assessed and found to be adequate (sensing > 5 mV, impedance = 400 - 1200 ohms, threshold < 1.5 V @ 0.4 msec). High output pacing performed and no evidence of phrenic nerve capture was observed. Adequate lead slack was confirmed as RV lead noted to lay along the floor of the RA without prolapsing into IVC. The sheath was split, and the lead was sutured to the pectoralis major muscle with a non-absorbable 0 silk suture over suture sleeve. A 7 Arabic sheath was introduced over the second J-tip wire, following which the wire and dilator were removed. A 52 cm 5.7 F Medtronic CapSureFix Novus MRI ScureScan (model: [id number]) active fixation atrial lead with soft straight stylet was introduced into the sheath, then the stylet was partially retracted and the lead was advanced into the IVC. The soft straight stylet was exchanged for the soft curved atrial stylet. A soft stylet was chosen to minimize the risk of cardiac perforation. The lead tip was positioned in the right atrial appendage (RAA). The lead tip was deployed. The EGM was reviewed and current of injury observed, which is associated with adequate threshold and long-term stability. No evidence of perforation (negative ST-segment) was observed. Lead parameters were assessed and found to be adequate (sensing > 1 mV, impedance = 400 - 1200 ohms, threshold < 1.5 V @ 0.4 msec). High output pacing performed and no evidence of phrenic nerve capture was observed. Adequate lead slack was confirmed as RA lead had a J-shape without engaging the RA floor or TV annulus.  The sheath was split, and the lead was sutured to the pectoralis major muscle with a non-absorbable 0 silk suture over suture sleeve. The pocket was then irrigated with antibiotic infused saline in order to remove tissue debris and uncover any persistent bleeding. Hemostasis was again confirmed. The leads were connected to the Dopiosa MRI XT DR (model: [id number]) device. The leads were curled in the pocket in fashion to avoid any acute angles. The device was then placed in a TYRX antibiotic pouch, then into the pocket with the leads beneath the device. SURGIFLO hemostatic matrix with thrombin was injected into the pocket above and below the device. Fluoroscopy was used to confirm ideal device and lead position in the pocket, lead connector pins in the device header, adequate lead slack, and lead position. The device was secured in place over the leads with a non-absorbable 0 silk suture. The pocket was closed with multiple layers of suture. An absorbable 2-0 Vicryl violet braided suture with CT-1 needle was used to approximate the clavipectoral deep fascial layer with running suture technique with deep bites of equal spacing in order to isolate the pocket, restore anterior wall structure, and prevent device erosion. An absorbable 2-0 Vicryl violet braided suture with CT-1 needle was used to approximate the subcutaneous tissue with running suture technique with deep bites of equal spacing. An absorbable 4-0 Monocryl suture with reverse cutting needle was used to approximate the subcuticular tissue with minimal tension through running suture technique with closely spaced bites and buried sutured knot at the ends. Dermabond was placed over the incision. An Aquacel Ag surgical dressing was placed over the incision. A pressure dressing was placed over the incision.      Final lead assessment:  RA: sensing = 5.6 mV, impedance = 760 ohms, capture threshold = 1.2 V @ 0.4 msec  RV: sensing = 12.1 mV, impedance = 627 ohms (HV = 66 ohms), capture threshold = 0.3 V @ 0.4 msec    The device was programmed:  DDD 60/130

## 2022-11-15 NOTE — ANESTHESIA PRE PROCEDURE
Department of Anesthesiology  Preprocedure Note       Name:  Lissett López   Age:  78 y.o.  :  1955                                          MRN:  [id number]         Date:  11/15/2022      Surgeon: * No surgeons listed *    Procedure: * No procedures listed *    Medications prior to admission:   Prior to Admission medications    Medication Sig Start Date End Date Taking?  Authorizing Provider   aspirin 81 MG chewable tablet Take 1 tablet by mouth daily 22   GARRET Baez CNP   metoprolol succinate (TOPROL XL) 25 MG extended release tablet Take 1 tablet by mouth 2 times daily 22   GARRET Baez CNP       Current medications:    Current Facility-Administered Medications   Medication Dose Route Frequency Provider Last Rate Last Admin    atorvastatin (LIPITOR) tablet 40 mg  40 mg Oral Nightly DelGARRET Laird - NP   40 mg at 22    magnesium oxide (MAG-OX) tablet 400 mg  400 mg Oral Daily Lisa Lomeli MD   400 mg at 11/15/22 0920    spironolactone (ALDACTONE) tablet 25 mg  25 mg Oral Daily Courtney Parikh MD   25 mg at 11/15/22 0920    aspirin chewable tablet 81 mg  81 mg Oral Daily Courtney Parikh MD   81 mg at 11/15/22 0920    metoprolol succinate (TOPROL XL) extended release tablet 25 mg  25 mg Oral BID Courtney Parikh MD   25 mg at 22    sodium chloride flush 0.9 % injection 5-40 mL  5-40 mL IntraVENous 2 times per day Courtney Parikh MD   10 mL at 11/15/22 0920    sodium chloride flush 0.9 % injection 5-40 mL  5-40 mL IntraVENous PRN Courtney Parikh MD        0.9 % sodium chloride infusion   IntraVENous PRN Courtney Parikh MD        acetaminophen (TYLENOL) tablet 650 mg  650 mg Oral Q4H PRN Courtney Parikh MD   650 mg at 22 5885    perflutren lipid microspheres (DEFINITY) injection 1.5 mL  1.5 mL IntraVENous ONCE PRN Courtney Parikh MD        sacubitril-valsartan (ENTRESTO) 24-26 MG per tablet 1 tablet  1 tablet Oral BID Courtney Parikh MD   1 tablet at 11/15/22 0920       Allergies:  No Known Allergies    Problem List:    Patient Active Problem List   Diagnosis Code    Ventricular tachycardia I47.20    CAD in native artery I25.10    [facility] I42.8    Lightheadedness R42    Elevated transaminase level R74.01       Past Medical History:  History reviewed. No pertinent past medical history. Past Surgical History:  History reviewed. No pertinent surgical history. Social History:    Social History     Tobacco Use    Smoking status: Former     Types: Cigarettes    Smokeless tobacco: Never   Substance Use Topics    Alcohol use: Not on file                                Counseling given: Not Answered      Vital Signs (Current):   Vitals:    11/15/22 0545 11/15/22 0738 11/15/22 1215 11/15/22 1433   BP:  111/68 92/60 110/66   Pulse:  61 61 61   Resp:  15 16 20   Temp:  37.1 °C (98.8 °F) 36.5 °C (97.7 °F) 36.3 °C (97.3 °F)   TempSrc:  Temporal Temporal    SpO2:  98% 98% 96%   Weight: 170 lb 6 oz (77.3 kg)   170 lb (77.1 kg)   Height:    5' 11\" (1.803 m)                                              BP Readings from Last 3 Encounters:   11/15/22 110/66   11/11/22 125/86       NPO Status:                                                                                 BMI:   Wt Readings from Last 3 Encounters:   11/15/22 170 lb (77.1 kg)   11/13/22 173 lb (78.5 kg)   11/11/22 180 lb (81.6 kg)     Body mass index is 23.71 kg/m².     CBC:   Lab Results   Component Value Date/Time    WBC 6.0 11/15/2022 09:01 AM    RBC 5.49 11/15/2022 09:01 AM    HGB 16.3 11/15/2022 09:01 AM    HCT 48.1 11/15/2022 09:01 AM    MCV 87.6 11/15/2022 09:01 AM    RDW 12.7 11/15/2022 09:01 AM     11/15/2022 09:01 AM       CMP:   Lab Results   Component Value Date/Time     11/15/2022 09:01 AM    K 4.5 11/15/2022 09:01 AM     11/15/2022 09:01 AM    CO2 23 11/15/2022 09:01 AM    BUN 19 11/15/2022 09:01 AM    CREATININE 0.9 11/15/2022 09:01 AM LABGLOM >60 11/15/2022 09:01 AM    GLUCOSE 99 11/15/2022 09:01 AM    PROT 6.5 11/15/2022 09:01 AM    CALCIUM 9.2 11/15/2022 09:01 AM    BILITOT 0.6 11/15/2022 09:01 AM    ALKPHOS 73 11/15/2022 09:01 AM    AST 27 11/15/2022 09:01 AM    ALT 89 11/15/2022 09:01 AM       POC Tests: No results for input(s): POCGLU, POCNA, POCK, POCCL, POCBUN, POCHEMO, POCHCT in the last 72 hours. Coags:   Lab Results   Component Value Date/Time    PROTIME 12.2 11/11/2022 04:49 AM    INR 1.1 11/11/2022 04:49 AM       HCG (If Applicable): No results found for: PREGTESTUR, PREGSERUM, HCG, HCGQUANT     ABGs: No results found for: PHART, PO2ART, XGE7WZX, NVF3TKQ, BEART, L3RUYBWZ     Type & Screen (If Applicable):  No results found for: LABABO, LABRH    Drug/Infectious Status (If Applicable):  No results found for: HIV, HEPCAB    COVID-19 Screening (If Applicable): No results found for: COVID19    CT HEAD WO CONTRAST    Result Date: 11/11/2022  EXAMINATION: CT OF THE HEAD WITHOUT CONTRAST  11/11/2022 6:06 am TECHNIQUE: CT of the head was performed without the administration of intravenous contrast. Automated exposure control, iterative reconstruction, and/or weight based adjustment of the mA/kV was utilized to reduce the radiation dose to as low as reasonably achievable. COMPARISON: None. HISTORY: ORDERING SYSTEM PROVIDED HISTORY: confusion TECHNOLOGIST PROVIDED HISTORY: Reason for exam:->confusion Has a \"code stroke\" or \"stroke alert\" been called? ->No Decision Support Exception - unselect if not a suspected or confirmed emergency medical condition->Emergency Medical Condition (MA) FINDINGS: BRAIN/VENTRICLES: There is no acute intracranial hemorrhage, mass effect or midline shift. No abnormal extra-axial fluid collection. The gray-white differentiation is maintained without evidence of an acute infarct. There is no evidence of hydrocephalus. ORBITS: The visualized portion of the orbits demonstrate no acute abnormality.  SINUSES: The visualized paranasal sinuses and mastoid air cells demonstrate no acute abnormality. SOFT TISSUES/SKULL:  No acute abnormality of the visualized skull or soft tissues. No acute intracranial abnormality. RECOMMENDATIONS: Careful clinical correlation and follow up recommended. US GALLBLADDER RUQ    Result Date: 11/12/2022  EXAMINATION: RIGHT UPPER QUADRANT ULTRASOUND 11/12/2022 7:28 am COMPARISON: None. HISTORY: ORDERING SYSTEM PROVIDED HISTORY: LFTs TECHNOLOGIST PROVIDED HISTORY: Reason for exam:->LFTs What reading provider will be dictating this exam?->CRC FINDINGS: LIVER:  Visualized portions appear unremarkable. BILIARY SYSTEM:  There is minimal pericholecystic fluid. Gallbladder wall is 2 mm. There is no evidence of cholelithiasis. No gallbladder sludge. No tenderness reported in the gallbladder fossa. Common bile duct is within normal limits measuring 2 mm. 1.  Normal size gallbladder. No evidence of cholelithiasis. No tenderness in the gallbladder fossa. Minimal pericholecystic fluid, etiology unclear. 2.  Normal common bile duct. RECOMMENDATIONS: If there is continued concern for gallbladder disease, consider nuclear medicine hepatobiliary scan. XR CHEST PORTABLE    Result Date: 11/11/2022  EXAMINATION: ONE XRAY VIEW OF THE CHEST 11/11/2022 5:16 am COMPARISON: None. HISTORY: ORDERING SYSTEM PROVIDED HISTORY: V. tach TECHNOLOGIST PROVIDED HISTORY: Reason for exam:->V. tach FINDINGS: Normal cardiomediastinal silhouette. Lungs clear. No pneumothorax or effusion. Osseous thorax intact. No acute disease. RECOMMENDATION: Careful clinical correlation and follow up recommended. EKG 12 lead  Order: [id number]   Status: Final result     Visible to patient: No (not released)     Next appt:  Today at 03:30 PM in IP Unit [facility] CVL EP LAB 1)     0 Result Notes  Component Ref Range & Units 11/11/22 1713 11/11/22 0447   Ventricular Rate BPM 76  85    Atrial Rate BPM 76  85    P-R Interval ms 134  136    QRS Duration ms 88  88    Q-T Interval ms 420  380    QTc Calculation (Bazett) ms 472  452    P Axis degrees 66  67    R Axis degrees -5  5    T Axis degrees 104  64    [facility]           Narrative & Impression    Normal sinus rhythm  Nonspecific ST and T wave abnormality  Abnormal ECG  No previous ECGs available  Confirmed by Yun Marinelli ([id number]) on 11/12/2022 8:57:58 AM             TTE:   Summary   Technically limited study. The left ventricle is dilated. Normal left ventricle wall thickness. Ejection fraction is visually estimated at 25%. There is doppler evidence of stage I diastolic dysfunction. Normal right ventricular size and function. The left atrium is borderline dilated. Mild pulmonary hypertension. Signature      ----------------------------------------------------------------   Electronically signed by Leatha Manley MD(Interpreting   physician) on 11/12/2022 09:00 PM      Heart Cath:  CONCLUSIONS:  1.  Mild coronary artery disease. a. Left main, no significant disease. b.  LAD, 40-50% ostial vessel narrowing and 40% mid vessel narrowing.  c.  LCX, 40% proximal vessel narrowing after a high first marginal  branch (intermediate ramus branch). d.  RCA, dominant vessel with around 30-40% proximal/mid vessel  narrowing. 2.  Dilated left ventricle with severe generalized hypokinesis with an  estimated ejection fraction of 20-25% (with mild mitral regurgitation). 3.  Mildly elevated left ventricular end-diastolic pressure.           Mariangel Figueroa MD     D: 11/11/2022 14:52:18      Anesthesia Evaluation  Patient summary reviewed and Nursing notes reviewed no history of anesthetic complications:   Airway: Mallampati: II  TM distance: >3 FB   Neck ROM: full  Mouth opening: > = 3 FB   Dental:      Comment: Multiple missing teeth. Denies any loose, capped or chipped teeth.     Pulmonary:normal exam  breath sounds clear to auscultation      (-) not a current smoker                           Cardiovascular:  Exercise tolerance: poor (<4 METS),   (+) CAD: obstructive, dysrhythmias (NSVT): ventricular tachycardia, CHF: systolic, PÉREZ (Intermittent): after ambulating 1 flight of stairs, pulmonary hypertension: mild,         Rhythm: regular  Rate: normal           Beta Blocker:  Dose within 24 Hrs      ROS comment: Nonischemic cardiomyopathy. LHC revealed mild coronary artery disease, a dilated left ventricle, and severe generalized hypokinesis with an EF of 20 to 25%. Neuro/Psych:   Negative Neuro/Psych ROS              GI/Hepatic/Renal:   (+) liver disease (Elevated LFTs, presumably secondary to hypoperfusion - improving):,           Endo/Other: Negative Endo/Other ROS             Pt had no PAT visit       Abdominal:             Vascular: negative vascular ROS. Other Findings:           Anesthesia Plan      MAC     ASA 4       Induction: intravenous. Anesthetic plan and risks discussed with patient. Plan discussed with attending.                     GARRET Mcgovern - PATIENCE   11/15/2022

## 2022-11-15 NOTE — ANESTHESIA POSTPROCEDURE EVALUATION
Department of Anesthesiology  Postprocedure Note    Patient: Lesley Suazo  MRN: [id number]  YOB: 1955  Date of evaluation: 11/15/2022      Procedure Summary     Date: 11/15/22 Room / Location: [facility]    Anesthesia Start: 4426 Anesthesia Stop: 5601    Procedure: ICD W ANESTHESIA Diagnosis:     Scheduled Providers: GARRET Lucero CRNA;  Willard White DO Responsible Provider: Willard White DO    Anesthesia Type: MAC ASA Status: 4          Anesthesia Type: MAC    Yue Phase I:      Yue Phase II:        Anesthesia Post Evaluation    Patient location during evaluation: bedside  Patient participation: complete - patient cannot participate  Level of consciousness: responsive to verbal stimuli  Airway patency: patent  Nausea & Vomiting: no nausea and no vomiting  Complications: no  Cardiovascular status: blood pressure returned to baseline  Respiratory status: acceptable  Hydration status: euvolemic  Multimodal analgesia pain management approach

## 2022-11-15 NOTE — CONSULTS
CHF NURSE NAVIGATOR CONSULT NOTE:      Patient currently admitted with diagnosis of Systolic heart failure. Patient was alert and oriented during the consultation. He was engaged and asked appropriate questions throughout the education session. He is agreeable to self monitoring, following a low sodium diet, medication compliance and outpatient follow up when he arrives back home to [facility]. Scheduling with the CHF clinic No: patient resides in [facility] . His PCP is aware of his admission and has been in phone contact with the attending providers here. ICD to be placed today. Future Appointments   Date Time Provider Matthias Alvarez   11/15/2022  3:30 PM [facility] CVL EP LAB 1 [place] C[place]   11/28/2022  9:30 AM SCHEDULE, [facility] [place] [facility] [place]       Barriers to Care:  Contributing risk factors for Heart Failure are [place]. The patient is ordered:  Diet: Diet NPO   Sodium controlled diet Yes after NPO status  Fluid restriction daily ordered (fluid restriction recommended if patient is hyponatremic and/or diuretic is initiated or increased) No  FR:   Daily Weights: Patient Vitals for the past 96 hrs (Last 3 readings):   Weight   11/15/22 0545 170 lb 6 oz (77.3 kg)   11/14/22 0914 170 lb 0.4 oz (77.1 kg)   11/14/22 0430 171 lb 12.8 oz (77.9 kg)     I/O:   Intake/Output Summary (Last 24 hours) at 11/15/2022 1111  Last data filed at 11/15/2022 0615  Gross per 24 hour   Intake 390 ml   Output 1500 ml   Net -1110 ml              We reviewed the introduction to Heart Failure, the HF zones, signs and symptoms to report on day 1 of onset, medications, medication compliance, the importance of obtaining daily weights, following a low sodium diet, reading food labels for the sodium content, keeping physician appointments, and smoking cessation.  We discussed writing / tracking daily weights on a calendar / log because a 5 pound gain in 1 week can sneak up if you are not tracking it. I advised patient they can reduce the risk for Heart Failure exacerbations by modifying / controlling the risk factors. We discussed self-managed care which includes the following:  to take medications as prescribed, report any intolerable side effects of medications to the cardiologist / doctor, do not just stop taking the medication; follow a cardiac heart healthy / low sodium diet; weigh yourself daily, exercise regularly- per doctor recommendation and not to smoke or use an excess amount of alcohol. We discussed calling the cardiologist / doctor with a weight gain of 3 pounds in one day or a total of 5 pounds or more in one week. Also, if you should have a significant weight loss of 3# or more in one day to call the doctor, they may need to decrease or hold the diuretic dose. On days you feels nauseated and not eating / drinking, having emesis or diarrhea,  informed to call the cardiologist  / doctor, they may need to decrease or hold diuretic to avoid dehydration. I stressed the importance of informing their cardiologist the first day of onset of any of the signs and symptoms in the \"Yellow Zone\" of the HF Zones. Patient verbalizes understanding. Greater than 30 minutes was spent educating patient. The Heart Failure Booklet given to the patient with additional patient education addressing:  What is Heart Failure? Things You Can Do to Live Well with HF  How to Take Your Medications  How to Eat Less Salt  [place] its Salt?   Exercising Well with Heart Failure  Signs and symptoms of HF to report  Weight Yourself Each Day  Home Self Management- activity, weight tracking, taking medications as prescribed, meals /diet planning (sodium and fluid restriction), how to read food labels, keeping physician follow ups, smoking cessation, follow the Heart Failure Zones  The Heart Failure zones  Every Dose Every Day      Instructed  to call 911 if you have any of the following symptoms: Struggling to breathe unrelieved with rest,     Having chest pain     Have confusion or cant think clearly          Lynette Dent RN BSN, RN  Heart Failure [address] [facility],[address] (CHF) AHA GUIDELINES  (Must be completed for Primary Diagnosis CHF or History of CHF)    Discharge Plan:  I placed the Heart Failure Home Instructions in patient's discharge instructions. Per Heart Failure GWTG, the patient should have a follow-up appointment made within 7 days of discharge. New Diagnosis Yes    ECHO Results most recent:  No results found for: LVEF, LVEFMODE                                     Social History     Tobacco Use   Smoking Status Former    Types: Cigarettes   Smokeless Tobacco Not on file          There is no immunization history on file for this patient.        Angiotensin-Converting-Enzyme (ACE) inhibitor ordered:  [] Yes  [x] No (specify contraindication):    [] Contraindicated  [] Hypotensive patient who was at immediate risk of cardiogenic shock  [] Hospitalized patient who experienced marked azotemia  [] Other Contraindications  [] Not Eligible  [] Not Tolerant  [] Patient Reason  [] System Reason  [] Other Reason    Angiotensin II receptor blockers (ARB) ordered:  [] Yes  [x] No (specify contraindication):    [] Contraindicated  [] Hypotensive patient who was at immediate risk of cardiogenic shock  [] Hospitalized patient who experienced marked azotemia  [] Other Contraindications    ARNI - Angiotensin Receptor Neprilysin Inhibitor ordered:  [x] Yes  [] No (specify contraindication):    [] ACE inhibitor use within the prior 36 hours  [] Allergy  [] Hyperkalemia  [] Hypotension  [] Renal dysfunction defined as creatinine > 2.5 mg/dL in men or > 2.0 mg/dL in women  [] Other Contraindications  [] Not Eligible  [] Not Tolerant  [] Patient Reason  []System Reason  []Other Reason      Beta Blocker ordered:    [x] Yes  Toprol XL  [] No (specify contraindication):    [] Contraindicated  [] Asthma  [] Fluid Overload  [] Low Blood Pressure  [] Patient recently treated with an intravenous positive inotropic agent  [] Other Contraindications  [] Not Eligible  [] Not Tolerant  [] Patient Reason  [] System Reason    SGLT2 Inhibitor ordered:  [] Yes  [x] No (specify contraindication):    [] Contraindicated  [] Patient currently on dialysis  [] Ketoacidosis  [] Known hypersensitivity to the medication  [] Type I diabetes (not approved for use in patients with Type I diabetes due to increased risk of ketoacidosis)  [] Other Contraindications  [] Not Eligible  [] Not Tolerant  [] Patient Reason  [] System Reason  [] Other Reason    Aldosterone Antagonist ordered:  [x] Yes  [] No (specify contraindication):    [] Contraindicated  [] Allergy due to aldosterone receptor antagonist  [] Hyperkalemia  [] Renal dysfunction defined as creatinine >2.5 mg/dL in men or >2.0 mg/dL in women.   [] Other contraindications  [] Not Eligible  [] Not Tolerant  [] Patient Reason  [] System Reason  [] Other Reason

## 2022-11-15 NOTE — PROGRESS NOTES
Hospitalist Progress Note      Synopsis: Patient admitted for Patient admitted as a transfer from [address] [place] [address] for left heart cath. Patient presented to [address] [place] [address] ED with complaints of lightheadedness. In ED he was found to be in V. tach which was terminated by synchronized cardioversion. Cardiology was consulted and he was placed on lidocaine infusion. He was transferred here for left heart cath. LHC revealed mild coronary artery disease, a dilated left ventricle, and severe generalized hypokinesis with an EF of 20 to 25%. EP was consulted for further evaluation and consideration for ICD implantation. A cardiac MRI was ordered to further evaluate etiology of cardiomyopathy, however, he has metal shrapnel in his eye and we are unable to safely obtain MRI. Plan is for ICD placement today. Hospital day 4     Subjective:  Seen and examined at bedside, patient denies chest pain or shortness of breath at this time. Explained patient is for ICD placement today, patient agreeable with this plan. Stable overnight. No issues reported. Patient seen and examined  Records reviewed. Temp (24hrs), Av.8 °F (36.6 °C), Min:97.2 °F (36.2 °C), Max:98.8 °F (37.1 °C)    DIET: Diet NPO  CODE: Full Code    Intake/Output Summary (Last 24 hours) at 11/15/2022 0856  Last data filed at 11/15/2022 0615  Gross per 24 hour   Intake 630 ml   Output 1950 ml   Net -1320 ml       Review of Systems: All bolded are positive; please see HPI  General:  Fever, chills, diaphoresis, fatigue, malaise, night sweats, weight loss  Psychological:  Anxiety, disorientation, hallucinations. ENT:  Epistaxis, headaches, vertigo, visual changes. Cardiovascular:  Chest pain, irregular heartbeats, palpitations, paroxysmal nocturnal dyspnea. Respiratory:  Shortness of breath, coughing, sputum production, hemoptysis, wheezing, orthopnea.   Gastrointestinal:  Nausea, vomiting, diarrhea, heartburn, constipation, abdominal pain, hematemesis, hematochezia, melena, acholic stools  Genito-Urinary:  Dysuria, urgency, frequency, hematuria  Musculoskeletal:  Joint pain, joint stiffness, joint swelling, muscle pain  Neurology:  Headache, focal neurological deficits, weakness, numbness, paresthesia  Derm:  Rashes, ulcers, excoriations, bruising  Extremities:  Decreased ROM, peripheral edema, mottling    Objective:    /68   Pulse 61   Temp 98.8 °F (37.1 °C) (Temporal)   Resp 15   Ht 7' 1\" (2.159 m)   Wt 170 lb 6 oz (77.3 kg)   SpO2 98%   BMI 23.76 kg/m²     General appearance: Pleasant elderly male in no apparent distress, appears stated age and cooperative. HEENT: Conjunctivae/corneas clear. Mucous membranes moist.  Neck: Supple. No JVD. Respiratory:  Clear to auscultation bilaterally. Normal respiratory effort. Cardiovascular:  RRR. S1, S2 without MRG. PV: Pulses palpable. No edema. Abdomen: Soft, non-tender, non-distended. +BS  Musculoskeletal: No obvious deformities. Skin: Normal skin color. No rashes or lesions. Good turgor. Neurologic:  Grossly non-focal. Awake, alert, following commands. Psychiatric: Alert and oriented, thought content appropriate, normal insight and judgement    Medications:  REVIEWED DAILY    Infusion Medications    sodium chloride       Scheduled Medications    atorvastatin  40 mg Oral Nightly    magnesium oxide  400 mg Oral Daily    spironolactone  25 mg Oral Daily    aspirin  81 mg Oral Daily    metoprolol succinate  25 mg Oral BID    sodium chloride flush  5-40 mL IntraVENous 2 times per day    sacubitril-valsartan  1 tablet Oral BID     PRN Meds: sodium chloride flush, sodium chloride, acetaminophen, perflutren lipid microspheres    Labs:     No results for input(s): WBC, HGB, HCT, PLT in the last 72 hours.     Recent Labs     11/13/22  0618 11/14/22  0420 11/15/22  0518    140 141   K 4.2 4.5 4.4    107 106   CO2 23 23 21*   BUN 10 17 21   CREATININE 0.8 0.9 0.9   CALCIUM 9.0 9.3 9.2 Recent Labs     11/13/22  0618 11/14/22  0420 11/15/22  0518   PROT 7.0 6.4 6.5   ALKPHOS 85 75 71   * 125* 93*   AST 69* 38 28   BILITOT 0.9 0.6 0.5       No results for input(s): INR in the last 72 hours. No results for input(s): Maikollorraine Strickland in the last 72 hours. Chronic labs:    Lab Results   Component Value Date    CHOL 116 11/12/2022    TRIG 71 11/12/2022    HDL 42 11/12/2022    LDLCALC 60 11/12/2022    TSH 1.260 11/11/2022    INR 1.1 11/11/2022    LABA1C 5.7 (H) 11/11/2022       Radiology: REVIEWED DAILY    Assessment:  Nonischemic cardiomyopathy-EF 20 to 25%  V. 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    Plan:  Cardiology s/o  EP following - for ICD placement today  Lidocaine stopped  Telemetry monitoring  GDMT-Toprol, Entresto, spironolactone   Statin      DVT Prophylaxis [x] Lovenox  []  Heparin [] DOAC [] PCDs [] Ambulation    GI Prophylaxis [] PPI  [] H2 Blocker   [] Carafate  [x] Diet/Tube Feeds   Level of care [] Med/Surg  [x] Intermediate  []  ICU   Diet Diet NPO    Family contact []  N/A    [] At bedside  [] Phone call   Disposition Patient requires continued admission ICD placement for today   MDM [x] Low    [] Moderate  []   High       Discharge Plan: Home pending ICD placement planned for today. Discharge will be complicated by driving restriction as patient is a  who lives in [facility] and is in from out of town.       +++++++++++++++++++++++++++++++++++++++++++++++++  Annette EasleyUniversity of Missouri Health Care [facility], [place]  +++++++++++++++++++++++++++++++++++++++++++++++++  NOTE: This report was transcribed using voice recognition software.  Every effort was made to ensure accuracy; however, inadvertent computerized transcription errors may be present.

## 2022-11-16 ENCOUNTER — TELEPHONE (OUTPATIENT)
Dept: NON INVASIVE DIAGNOSTICS | Age: 67
End: 2022-11-16

## 2022-11-16 LAB
ALBUMIN SERPL-MCNC: 3.5 G/DL (ref 3.5–5.2)
ALP BLD-CCNC: 69 U/L (ref 40–129)
ALT SERPL-CCNC: 74 U/L (ref 0–40)
ANION GAP SERPL CALCULATED.3IONS-SCNC: 13 MMOL/L (ref 7–16)
AST SERPL-CCNC: 33 U/L (ref 0–39)
BASOPHILS ABSOLUTE: 0.04 E9/L (ref 0–0.2)
BASOPHILS RELATIVE PERCENT: 0.5 % (ref 0–2)
BILIRUB SERPL-MCNC: 0.6 MG/DL (ref 0–1.2)
BUN BLDV-MCNC: 18 MG/DL (ref 6–23)
CALCIUM SERPL-MCNC: 9.5 MG/DL (ref 8.6–10.2)
CHLORIDE BLD-SCNC: 105 MMOL/L (ref 98–107)
CO2: 21 MMOL/L (ref 22–29)
CREAT SERPL-MCNC: 0.9 MG/DL (ref 0.7–1.2)
EKG ATRIAL RATE: 61 BPM
EKG P AXIS: 58 DEGREES
EKG P-R INTERVAL: 140 MS
EKG Q-T INTERVAL: 442 MS
EKG QRS DURATION: 84 MS
EKG QTC CALCULATION (BAZETT): 444 MS
EKG R AXIS: -13 DEGREES
EKG T AXIS: 102 DEGREES
EKG VENTRICULAR RATE: 61 BPM
EOSINOPHILS ABSOLUTE: 0.14 E9/L (ref 0.05–0.5)
EOSINOPHILS RELATIVE PERCENT: 1.8 % (ref 0–6)
GFR SERPL CREATININE-BSD FRML MDRD: >60 ML/MIN/1.73
GLUCOSE BLD-MCNC: 96 MG/DL (ref 74–99)
HCT VFR BLD CALC: 47.5 % (ref 37–54)
HEMOGLOBIN: 16.3 G/DL (ref 12.5–16.5)
IMMATURE GRANULOCYTES #: 0.06 E9/L
IMMATURE GRANULOCYTES %: 0.8 % (ref 0–5)
LYMPHOCYTES ABSOLUTE: 1.66 E9/L (ref 1.5–4)
LYMPHOCYTES RELATIVE PERCENT: 21.8 % (ref 20–42)
MCH RBC QN AUTO: 30 PG (ref 26–35)
MCHC RBC AUTO-ENTMCNC: 34.3 % (ref 32–34.5)
MCV RBC AUTO: 87.5 FL (ref 80–99.9)
MONOCYTES ABSOLUTE: 0.77 E9/L (ref 0.1–0.95)
MONOCYTES RELATIVE PERCENT: 10.1 % (ref 2–12)
NEUTROPHILS ABSOLUTE: 4.96 E9/L (ref 1.8–7.3)
NEUTROPHILS RELATIVE PERCENT: 65 % (ref 43–80)
PDW BLD-RTO: 12.7 FL (ref 11.5–15)
PLATELET # BLD: 173 E9/L (ref 130–450)
PMV BLD AUTO: 11.7 FL (ref 7–12)
POTASSIUM REFLEX MAGNESIUM: 4.8 MMOL/L (ref 3.5–5)
RBC # BLD: 5.43 E12/L (ref 3.8–5.8)
SODIUM BLD-SCNC: 139 MMOL/L (ref 132–146)
TOTAL PROTEIN: 6.6 G/DL (ref 6.4–8.3)
WBC # BLD: 7.6 E9/L (ref 4.5–11.5)

## 2022-11-16 PROCEDURE — 80053 COMPREHEN METABOLIC PANEL: CPT

## 2022-11-16 PROCEDURE — 85025 COMPLETE CBC W/AUTO DIFF WBC: CPT

## 2022-11-16 PROCEDURE — 2580000003 HC RX 258: Performed by: INTERNAL MEDICINE

## 2022-11-16 PROCEDURE — 6370000000 HC RX 637 (ALT 250 FOR IP): Performed by: NURSE PRACTITIONER

## 2022-11-16 PROCEDURE — 93283 PRGRMG EVAL IMPLANTABLE DFB: CPT | Performed by: INTERNAL MEDICINE

## 2022-11-16 PROCEDURE — 99233 SBSQ HOSP IP/OBS HIGH 50: CPT | Performed by: INTERNAL MEDICINE

## 2022-11-16 PROCEDURE — 36415 COLL VENOUS BLD VENIPUNCTURE: CPT

## 2022-11-16 PROCEDURE — 6370000000 HC RX 637 (ALT 250 FOR IP): Performed by: INTERNAL MEDICINE

## 2022-11-16 PROCEDURE — 2140000000 HC CCU INTERMEDIATE R&B

## 2022-11-16 PROCEDURE — 2580000003 HC RX 258: Performed by: STUDENT IN AN ORGANIZED HEALTH CARE EDUCATION/TRAINING PROGRAM

## 2022-11-16 RX ADMIN — METOPROLOL SUCCINATE 25 MG: 25 TABLET, FILM COATED, EXTENDED RELEASE ORAL at 20:40

## 2022-11-16 RX ADMIN — ASPIRIN 81 MG CHEWABLE TABLET 81 MG: 81 TABLET CHEWABLE at 10:57

## 2022-11-16 RX ADMIN — ATORVASTATIN CALCIUM 40 MG: 40 TABLET, FILM COATED ORAL at 20:39

## 2022-11-16 RX ADMIN — MAGNESIUM OXIDE 400 MG (241.3 MG MAGNESIUM) TABLET 400 MG: TABLET at 10:57

## 2022-11-16 RX ADMIN — SODIUM CHLORIDE, PRESERVATIVE FREE 20 ML: 5 INJECTION INTRAVENOUS at 11:03

## 2022-11-16 RX ADMIN — METOPROLOL SUCCINATE 25 MG: 25 TABLET, FILM COATED, EXTENDED RELEASE ORAL at 10:57

## 2022-11-16 RX ADMIN — SACUBITRIL AND VALSARTAN 1 TABLET: 24; 26 TABLET, FILM COATED ORAL at 20:39

## 2022-11-16 RX ADMIN — SODIUM CHLORIDE, PRESERVATIVE FREE 10 ML: 5 INJECTION INTRAVENOUS at 11:02

## 2022-11-16 RX ADMIN — SODIUM CHLORIDE, PRESERVATIVE FREE 10 ML: 5 INJECTION INTRAVENOUS at 20:40

## 2022-11-16 RX ADMIN — SACUBITRIL AND VALSARTAN 1 TABLET: 24; 26 TABLET, FILM COATED ORAL at 10:57

## 2022-11-16 RX ADMIN — SPIRONOLACTONE 25 MG: 25 TABLET ORAL at 10:57

## 2022-11-16 NOTE — PROGRESS NOTES
Hospitalist Progress Note      Synopsis:   Briefly, patient presented to [facility] ED with complaints of lightheadedness. In ED he was found to be in V. tach which was terminated by synchronized cardioversion. Cardiology was consulted and he was placed on lidocaine infusion. He was transferred here for left heart cath. LHC revealed mild coronary artery disease, a dilated left ventricle, and severe generalized hypokinesis with an EF of 20 to 25%. EP was consulted for further evaluation and consideration for ICD implantation. A cardiac MRI was ordered to further evaluate etiology of cardiomyopathy, however, he has metal shrapnel in his eye and we are unable to safely obtain MRI. Patient had Medtronic dual-chamber ICD implant on 11/15/2022. Hospital day 5     Subjective:  Stable overnight. No issues reported. Patient seen and examined  Records reviewed. Temp (24hrs), Av.8 °F (36.6 °C), Min:97.3 °F (36.3 °C), Max:98.4 °F (36.9 °C)    DIET: ADULT DIET; Regular; Low Fat/Low Chol/High Fiber/2 gm Na  CODE: Full Code    Intake/Output Summary (Last 24 hours) at 2022 0820  Last data filed at 2022 0337  Gross per 24 hour   Intake 1080 ml   Output 950 ml   Net 130 ml       Review of Systems: All bolded are positive; please see HPI  General:  Fever, chills, diaphoresis, fatigue, malaise, night sweats, weight loss  Psychological:  Anxiety, disorientation, hallucinations. ENT:  Epistaxis, headaches, vertigo, visual changes. Cardiovascular:  Chest pain, irregular heartbeats, palpitations, paroxysmal nocturnal dyspnea. Respiratory:  Shortness of breath, coughing, sputum production, hemoptysis, wheezing, orthopnea.   Gastrointestinal:  Nausea, vomiting, diarrhea, heartburn, constipation, abdominal pain, hematemesis, hematochezia, melena, acholic stools  Genito-Urinary:  Dysuria, urgency, frequency, hematuria  Musculoskeletal:  Joint pain, joint stiffness, joint swelling, muscle pain  Neurology: Headache, focal neurological deficits, weakness, numbness, paresthesia  Derm:  Rashes, ulcers, excoriations, bruising  Extremities:  Decreased ROM, peripheral edema, mottling    Objective:    /60   Pulse 62   Temp 98.2 °F (36.8 °C) (Temporal)   Resp 18   Ht 5' 11\" (1.803 m)   Wt 170 lb (77.1 kg)   SpO2 95%   BMI 23.71 kg/m²     General appearance: No apparent distress, appears stated age and cooperative. HEENT: Conjunctivae/corneas clear. Mucous membranes moist.  Neck: Supple. No JVD. Respiratory:  Clear to auscultation bilaterally. Normal respiratory effort. Cardiovascular:  RRR. S1, S2 without MRG. PV: Pulses palpable. No edema. Abdomen: Soft, non-tender, non-distended. +BS  Musculoskeletal: No obvious deformities. Skin: Normal skin color. No rashes or lesions. Good turgor. Neurologic:  Grossly non-focal. Awake, alert, following commands.    Psychiatric: Alert and oriented, thought content appropriate, normal insight and judgement    Medications:  REVIEWED DAILY    Infusion Medications    sodium chloride      sodium chloride       Scheduled Medications    sodium chloride flush  5-40 mL IntraVENous 2 times per day    atorvastatin  40 mg Oral Nightly    magnesium oxide  400 mg Oral Daily    spironolactone  25 mg Oral Daily    aspirin  81 mg Oral Daily    metoprolol succinate  25 mg Oral BID    sodium chloride flush  5-40 mL IntraVENous 2 times per day    sacubitril-valsartan  1 tablet Oral BID     PRN Meds: sodium chloride flush, sodium chloride, acetaminophen, sodium chloride flush, sodium chloride, perflutren lipid microspheres    Labs:     Recent Labs     11/15/22  0901 11/16/22  0511   WBC 6.0 7.6   HGB 16.3 16.3   HCT 48.1 47.5    173       Recent Labs     11/15/22  0518 11/15/22  0901 11/16/22  0511    137 139   K 4.4 4.5 4.8    104 105   CO2 21* 23 21*   BUN 21 19 18   CREATININE 0.9 0.9 0.9   CALCIUM 9.2 9.2 9.5       Recent Labs     11/15/22  0518 11/15/22  0901 11/16/22  0511   PROT 6.5 6.5 6.6   ALKPHOS 71 73 69   ALT 93* 89* 74*   AST 28 27 33   BILITOT 0.5 0.6 0.6       No results for input(s): INR in the last 72 hours. No results for input(s): Breanne Rands in the last 72 hours. Chronic labs:    Lab Results   Component Value Date    CHOL 116 11/12/2022    TRIG 71 11/12/2022    HDL 42 11/12/2022    LDLCALC 60 11/12/2022    TSH 1.260 11/11/2022    INR 1.1 11/11/2022    LABA1C 5.7 (H) 11/11/2022       Radiology: REVIEWED DAILY    Assessment:  Nonischemic cardiomyopathy-EF 20 to 25%  V. 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    Plan:  Cardiology s/o  EP following - for ICD placed on 11/16/2022  Lidocaine stopped  Telemetry monitoring  GDMT-Toprol, Entresto, spironolactone   Statin      DVT Prophylaxis [x] Lovenox  []  Heparin [] DOAC [] PCDs [] Ambulation    GI Prophylaxis [] PPI  [] H2 Blocker   [] Carafate  [x] Diet/Tube Feeds   Level of care [] Med/Surg  [x] Intermediate  []  ICU   Diet ADULT DIET; Regular; Low Fat/Low Chol/High Fiber/2 gm Na    Family contact [x]  N/A    [] At bedside  [] Phone call   Disposition Patient requires continued admission further observation and management post ICD implantation   MDM [] Low    [x] Moderate  []   High       Discharge Plan: To home when clinically stable and plan for him to return to [facility] without driving in place.     +++++++++++++++++++++++++++++++++++++++++++++++++  Isamar Rojas, [address], [place]  +++++++++++++++++++++++++++++++++++++++++++++++++  NOTE: This report was transcribed using voice recognition software. Every effort was made to ensure accuracy; however, inadvertent computerized transcription errors may be present.

## 2022-11-16 NOTE — NURSE NAVIGATOR
I spoke with Nehemias Tamayo in the [address] [place] [address] and confirmed that Mr. Case can be remotely monitored with his  medtronic monitor that he has to take back with him to [facility]. Pt is to follow up in 2 weeks with his primary care physician in [place] [address] and needs to get a EP physician at home to follow his ICD. Juventino can monitor until he is established with another EP in [facility]. Nehemias Tamayo cancelled his 2 wk fu appt as he is returning to [place] [address]. I spoke to the patient regarding this and he voiced understanding.

## 2022-11-16 NOTE — TELEPHONE ENCOUNTER
----- Message from Kaylin Rodriguez DO sent at 11/15/2022  6:07 PM EST -----  Regarding: appt  EP DAKOTA in 4 weeks to discuss DCCV and review device function.    -Kaylin Rodriguez DO

## 2022-11-16 NOTE — NURSE NAVIGATOR
ARRHYTHMIA EDUCATION     Met with patient to review information relating to Non-Ischemic Cardiomyopathy, MMVT, Sudden Cardiac Death, & ICD Insertion    Discussed the following topics: The Heart's Electrical System, Non-Ischemic Cardiomyopathy, MMVT,Sudden Cardiac Death, ICD, Post Operative Care of ICD, & ICD DC Instructions     Handouts given on above topics given & questions answered. Patient verbalized understanding as evidenced by \"teach back\". Time spent with patient 45 minutes.
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
## 2022-11-16 NOTE — TELEPHONE ENCOUNTER
Discussed with Tiffani Jackson RN. Patient is going back to [facility] and will have his wound follow up done with his PCP per Taryn Trejo NP and he will be referred to an EP in [facility]. Patient is currently enrolled in our carelink. Once patient finds an EP doctor we will transfer his remotes to their office. Plan:   Follow remotes until patient finds EP physician in [facility].        Nilesh Ayon

## 2022-11-16 NOTE — CARE COORDINATION
Met with pt in room to confirm transportation to [facility]. Pt stated his friend and doctor, Dr Sharifa Dickinson, is unable to provide transportation until tomorrow. Her brother and sister in law is picking pt up at [facility] tomorrow. I placed a call to Dr Brian Staley to confirm and had to leave voicemail. [phone]. I provided pt with [facility] address. 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 Parisa Wyatt rn manager of case management updated and was ok if pt needed a taxi voucher to get to a local truck stop to get a ride with a [employer] truckdriver. Pt's nurse is checking for discharge. [phone]  Per pt's nurse, no discharge today.

## 2022-11-17 VITALS
BODY MASS INDEX: 24.01 KG/M2 | WEIGHT: 171.5 LBS | OXYGEN SATURATION: 95 % | HEIGHT: 71 IN | SYSTOLIC BLOOD PRESSURE: 114 MMHG | TEMPERATURE: 98 F | RESPIRATION RATE: 18 BRPM | DIASTOLIC BLOOD PRESSURE: 70 MMHG | HEART RATE: 64 BPM

## 2022-11-17 LAB
ALBUMIN SERPL-MCNC: 3.7 G/DL (ref 3.5–5.2)
ALP BLD-CCNC: 70 U/L (ref 40–129)
ALT SERPL-CCNC: 64 U/L (ref 0–40)
ANION GAP SERPL CALCULATED.3IONS-SCNC: 9 MMOL/L (ref 7–16)
AST SERPL-CCNC: 36 U/L (ref 0–39)
BASOPHILS ABSOLUTE: 0.05 E9/L (ref 0–0.2)
BASOPHILS RELATIVE PERCENT: 0.7 % (ref 0–2)
BILIRUB SERPL-MCNC: 0.5 MG/DL (ref 0–1.2)
BUN BLDV-MCNC: 14 MG/DL (ref 6–23)
CALCIUM SERPL-MCNC: 9 MG/DL (ref 8.6–10.2)
CHLORIDE BLD-SCNC: 104 MMOL/L (ref 98–107)
CO2: 23 MMOL/L (ref 22–29)
CREAT SERPL-MCNC: 0.9 MG/DL (ref 0.7–1.2)
EOSINOPHILS ABSOLUTE: 0.13 E9/L (ref 0.05–0.5)
EOSINOPHILS RELATIVE PERCENT: 1.9 % (ref 0–6)
GFR SERPL CREATININE-BSD FRML MDRD: >60 ML/MIN/1.73
GLUCOSE BLD-MCNC: 107 MG/DL (ref 74–99)
HCT VFR BLD CALC: 46.3 % (ref 37–54)
HEMOGLOBIN: 15.8 G/DL (ref 12.5–16.5)
IMMATURE GRANULOCYTES #: 0.04 E9/L
IMMATURE GRANULOCYTES %: 0.6 % (ref 0–5)
LYMPHOCYTES ABSOLUTE: 1.6 E9/L (ref 1.5–4)
LYMPHOCYTES RELATIVE PERCENT: 23.4 % (ref 20–42)
MCH RBC QN AUTO: 29.9 PG (ref 26–35)
MCHC RBC AUTO-ENTMCNC: 34.1 % (ref 32–34.5)
MCV RBC AUTO: 87.5 FL (ref 80–99.9)
MONOCYTES ABSOLUTE: 0.82 E9/L (ref 0.1–0.95)
MONOCYTES RELATIVE PERCENT: 12 % (ref 2–12)
NEUTROPHILS ABSOLUTE: 4.21 E9/L (ref 1.8–7.3)
NEUTROPHILS RELATIVE PERCENT: 61.4 % (ref 43–80)
PDW BLD-RTO: 12.6 FL (ref 11.5–15)
PLATELET # BLD: 180 E9/L (ref 130–450)
PMV BLD AUTO: 11.6 FL (ref 7–12)
POTASSIUM REFLEX MAGNESIUM: 4.4 MMOL/L (ref 3.5–5)
RBC # BLD: 5.29 E12/L (ref 3.8–5.8)
SODIUM BLD-SCNC: 136 MMOL/L (ref 132–146)
TOTAL PROTEIN: 6.3 G/DL (ref 6.4–8.3)
WBC # BLD: 6.9 E9/L (ref 4.5–11.5)

## 2022-11-17 PROCEDURE — 6370000000 HC RX 637 (ALT 250 FOR IP): Performed by: INTERNAL MEDICINE

## 2022-11-17 PROCEDURE — 85025 COMPLETE CBC W/AUTO DIFF WBC: CPT

## 2022-11-17 PROCEDURE — 36415 COLL VENOUS BLD VENIPUNCTURE: CPT

## 2022-11-17 PROCEDURE — 80053 COMPREHEN METABOLIC PANEL: CPT

## 2022-11-17 PROCEDURE — 2580000003 HC RX 258: Performed by: STUDENT IN AN ORGANIZED HEALTH CARE EDUCATION/TRAINING PROGRAM

## 2022-11-17 RX ORDER — LANOLIN ALCOHOL/MO/W.PET/CERES
400 CREAM (GRAM) TOPICAL DAILY
Qty: 30 TABLET | Refills: 0 | Status: SHIPPED | OUTPATIENT
Start: 2022-11-18

## 2022-11-17 RX ORDER — ATORVASTATIN CALCIUM 40 MG/1
40 TABLET, FILM COATED ORAL NIGHTLY
Qty: 30 TABLET | Refills: 3 | Status: SHIPPED | OUTPATIENT
Start: 2022-11-17

## 2022-11-17 RX ORDER — SPIRONOLACTONE 25 MG/1
25 TABLET ORAL DAILY
Qty: 30 TABLET | Refills: 3 | Status: SHIPPED | OUTPATIENT
Start: 2022-11-18

## 2022-11-17 RX ORDER — METOPROLOL SUCCINATE 25 MG/1
25 TABLET, EXTENDED RELEASE ORAL 2 TIMES DAILY
Qty: 30 TABLET | Refills: 0 | Status: SHIPPED | OUTPATIENT
Start: 2022-11-17

## 2022-11-17 RX ADMIN — MAGNESIUM OXIDE 400 MG (241.3 MG MAGNESIUM) TABLET 400 MG: TABLET at 08:36

## 2022-11-17 RX ADMIN — SODIUM CHLORIDE, PRESERVATIVE FREE 10 ML: 5 INJECTION INTRAVENOUS at 08:36

## 2022-11-17 RX ADMIN — SACUBITRIL AND VALSARTAN 1 TABLET: 24; 26 TABLET, FILM COATED ORAL at 08:36

## 2022-11-17 RX ADMIN — ASPIRIN 81 MG CHEWABLE TABLET 81 MG: 81 TABLET CHEWABLE at 08:36

## 2022-11-17 RX ADMIN — METOPROLOL SUCCINATE 25 MG: 25 TABLET, FILM COATED, EXTENDED RELEASE ORAL at 08:36

## 2022-11-17 RX ADMIN — SPIRONOLACTONE 25 MG: 25 TABLET ORAL at 08:35

## 2022-11-17 NOTE — DISCHARGE SUMMARY
Pt discharged to taxi, then to truck for , the home to [place]. Provided with paper script for Toprol as pt says he does not have prescription. Immobilizer in place. Discharge instructions given to patient, pt denies any questions. Heart monitor returned to nurses station.

## 2022-11-17 NOTE — CARE COORDINATION
Met with patient to discuss his transporttion back to [facility]. He asked that I speak with his PCP/friend Dr. Radha Tadeo  at [phone]. Per Dr. Radha Tadeo patient will be picked up between  11 and 12 pm at the [address] by a truck that will transport patient to his brothers home in [facility]. A taxi voucher was provided and verified with taxi company at [phone] that they can transport from [facility] to [address] [place] [address]. Staff nurse and patient informed. Taxi voucher attached to soft chart.

## 2022-11-17 NOTE — PLAN OF CARE
Problem: Discharge Planning  Goal: Discharge to home or other facility with appropriate resources  11/17/2022 1444 by Roseline Garcia RN  Outcome: Adequate for Discharge  11/17/2022 0206 by Tonja Dominguez RN  Outcome: Progressing     Problem: Pain  Goal: Verbalizes/displays adequate comfort level or baseline comfort level  11/17/2022 1444 by Roseline Garcia RN  Outcome: Adequate for Discharge  11/17/2022 0206 by Tonja Dominguez RN  Outcome: Progressing     Problem: ABCDS Injury Assessment  Goal: Absence of physical injury  11/17/2022 1444 by Roseline Garcia RN  Outcome: Adequate for Discharge  11/17/2022 0206 by Tonja Dominguez RN  Outcome: Progressing     Problem: Safety - Adult  Goal: Free from fall injury  11/17/2022 1444 by Roseline Garcia RN  Outcome: Adequate for Discharge  11/17/2022 0206 by Tonja Dominguez RN  Outcome: Progressing     Problem: Cardiovascular - Adult  Goal: Maintains optimal cardiac output and hemodynamic stability  11/17/2022 1444 by Roseline Garcia RN  Outcome: Adequate for Discharge  11/17/2022 0206 by Tonja Dominguez RN  Outcome: Progressing     Problem: Metabolic/Fluid and Electrolytes - Adult  Goal: Hemodynamic stability and optimal renal function maintained  11/17/2022 1444 by Roseline Garcia RN  Outcome: Adequate for Discharge  11/17/2022 0206 by Tonja Dominguez RN  Outcome: Progressing

## 2022-11-17 NOTE — DISCHARGE SUMMARY
Hospitalist Discharge Summary    Patient ID: Kat Juares   Patient : [id number]  Patient's PCP: No primary care provider on file. Admit Date: 2022   Admitting Physician: No admitting provider for patient encounter. Discharge Date:  2022   Discharge Physician: GARRET Simms CNP   Discharge Condition: Stable  Discharge Disposition: [facility] course in brief:  Briefly, patient presented to [facility] ED with complaints of lightheadedness. In ED he was found to be in V. tach which was terminated by synchronized cardioversion. Cardiology was consulted and he was placed on lidocaine infusion. He was transferred here for left heart cath. LHC revealed mild coronary artery disease, a dilated left ventricle, and severe generalized hypokinesis with an EF of 20 to 25%. EP was consulted for further evaluation and consideration for ICD implantation. A cardiac MRI was ordered to further evaluate etiology of cardiomyopathy, however, he has metal shrapnel in his eye and we are unable to safely obtain MRI. Patient had [employer] dual-chamber ICD implant on 11/15/2022. Patient is stable for discharge and is to follow-up with his PCP in [facility] and has been instructed to find an EP physician to follow-up with on his ICD. Patient was instructed about importance of not driving and has a ride back to [facility]. Patient stable and cleared for discharge from a medical standpoint.           Consults:   IP CONSULT TO SOCIAL WORK  IP CONSULT TO ELECTROPHYSIOLOGY  IP CONSULT TO HEART FAILURE NURSE/COORDINATOR    Discharge Diagnoses:  Nonischemic cardiomyopathy-EF 20 to 25%  V. 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      Discharge Instructions / Follow up:    Future Appointments   Date Time Provider Department Center   12/12/2022  8:00 AM GARRET Zepeda - CNP ELECTRO [facility]       The patient's condition is stable. At this time the patient is without objective evidence of an acute process requiring continuing hospitalization or inpatient management. They are stable for discharge with outpatient follow-up. I have spoken with the patient and discussed the results of the current hospitalization, in addition to providing specific details for the plan of care and counseling regarding the diagnosis and prognosis. The plan has been discussed in detail and they are aware of the specific conditions for emergent return, as well as the importance of follow-up. Their questions are answered at this time and they are agreeable with the plan for discharge to home. Continued appropriate risk factor modification of blood pressure, diabetes and serum lipids will remain essential to reducing risk of future atherosclerotic development    Activity: activity as tolerated    Physical exam:  General appearance: No apparent distress, appears stated age and cooperative. HEENT: Normal cephalic, atraumatic without obvious deformity. Pupils equal, round, and reactive to light. Extra ocular muscles intact. Conjunctivae/corneas clear. Neck: Supple, with full range of motion. No jugular venous distention. Trachea midline. Respiratory:  Clear to auscultation bilaterally. No apparent distress. Cardiovascular:  Regular rate and rhythm. S1, S2 without murmurs, rubs, or gallops. PV: Brisk capillary refill. +2 pedal and radial pulses bilaterally. No clubbing, cyanosis, edema of bilateral lower extremities. Abdomen: Soft, non-tender, non-distended. +BS  Musculoskeletal: No obvious deformities or erythematous or edematous joints. Skin: Normal skin color. No rashes or lesions. Neurologic:  Neurovascularly intact without any focal sensory/motor deficits.  Cranial nerves: II-XII intact, grossly non-focal.  Psychiatric: Alert and oriented, thought content appropriate, normal insight    Significant labs:  CBC:   Recent Labs     11/15/22  0901 11/16/22  0511 11/17/22  0552   WBC 6.0 7.6 6.9   RBC 5.49 5.43 5.29   HGB 16.3 16.3 15.8   HCT 48.1 47.5 46.3   MCV 87.6 87.5 87.5   RDW 12.7 12.7 12.6    173 180     BMP:   Recent Labs     11/15/22  0901 11/16/22  0511 11/17/22  0552    139 136   K 4.5 4.8 4.4    105 104   CO2 23 21* 23   BUN 19 18 14   CREATININE 0.9 0.9 0.9     LFT:  Recent Labs     11/15/22  0901 11/16/22  0511 11/17/22  0552   PROT 6.5 6.6 6.3*   ALKPHOS 73 69 70   ALT 89* 74* 64*   AST 27 33 36   BILITOT 0.6 0.6 0.5     PT/INR: No results for input(s): INR, APTT in the last 72 hours. BNP: No results for input(s): BNP in the last 72 hours. Hgb A1C:   Lab Results   Component Value Date    LABA1C 5.7 (H) 11/11/2022     Folate and B12: No results found for: Vanessa Pappason, No results found for: FOLATE  Thyroid Studies:   Lab Results   Component Value Date    TSH 1.260 11/11/2022       Urinalysis:  No results found for: Huong Fergusson, WBCUA, BACTERIA, RBCUA, BLOODU, SPECGRAV, GLUCOSEU    Imaging:  CT HEAD WO CONTRAST    Result Date: 11/11/2022  EXAMINATION: CT OF THE HEAD WITHOUT CONTRAST  11/11/2022 6:06 am TECHNIQUE: CT of the head was performed without the administration of intravenous contrast. Automated exposure control, iterative reconstruction, and/or weight based adjustment of the mA/kV was utilized to reduce the radiation dose to as low as reasonably achievable. COMPARISON: None. HISTORY: ORDERING SYSTEM PROVIDED HISTORY: confusion TECHNOLOGIST PROVIDED HISTORY: Reason for exam:->confusion Has a \"code stroke\" or \"stroke alert\" been called? ->No Decision Support Exception - unselect if not a suspected or confirmed emergency medical condition->Emergency Medical Condition (MA) FINDINGS: BRAIN/VENTRICLES: There is no acute intracranial hemorrhage, mass effect or midline shift. No abnormal extra-axial fluid collection. The gray-white differentiation is maintained without evidence of an acute infarct. There is no evidence of hydrocephalus. ORBITS: The visualized portion of the orbits demonstrate no acute abnormality. SINUSES: The visualized paranasal sinuses and mastoid air cells demonstrate no acute abnormality. SOFT TISSUES/SKULL:  No acute abnormality of the visualized skull or soft tissues. No acute intracranial abnormality. RECOMMENDATIONS: Careful clinical correlation and follow up recommended. US GALLBLADDER RUQ    Result Date: 11/12/2022  EXAMINATION: RIGHT UPPER QUADRANT ULTRASOUND 11/12/2022 7:28 am COMPARISON: None. HISTORY: ORDERING SYSTEM PROVIDED HISTORY: LFTs TECHNOLOGIST PROVIDED HISTORY: Reason for exam:->LFTs What reading provider will be dictating this exam?->CRC FINDINGS: LIVER:  Visualized portions appear unremarkable. BILIARY SYSTEM:  There is minimal pericholecystic fluid. Gallbladder wall is 2 mm. There is no evidence of cholelithiasis. No gallbladder sludge. No tenderness reported in the gallbladder fossa. Common bile duct is within normal limits measuring 2 mm. 1.  Normal size gallbladder. No evidence of cholelithiasis. No tenderness in the gallbladder fossa. Minimal pericholecystic fluid, etiology unclear. 2.  Normal common bile duct. RECOMMENDATIONS: If there is continued concern for gallbladder disease, consider nuclear medicine hepatobiliary scan. XR CHEST PORTABLE    Result Date: 11/15/2022  EXAMINATION: ONE XRAY VIEW OF THE CHEST 11/15/2022 6:11 pm COMPARISON: 11 November 2022 HISTORY: ORDERING SYSTEM PROVIDED HISTORY: Pacemaker placement and rule out pneumothorax TECHNOLOGIST PROVIDED HISTORY: Reason for exam:->Pacemaker placement and rule out pneumothorax What reading provider will be dictating this exam?->CRC FINDINGS: Single AP semi upright portable chest demonstrates a new dual-chamber cardiac pacemaker in place with defibrillator in good position.   There is no evidence of a pneumothorax. The soft tissues and osseous structures appear normal. The lungs are clear and the cardiac silhouette appears unremarkable. New dual-chamber cardiac pacemaker in good position with no pneumothorax. XR CHEST PORTABLE    Result Date: 11/11/2022  EXAMINATION: ONE XRAY VIEW OF THE CHEST 11/11/2022 5:16 am COMPARISON: None. HISTORY: ORDERING SYSTEM PROVIDED HISTORY: V. tach TECHNOLOGIST PROVIDED HISTORY: Reason for exam:->V. tach FINDINGS: Normal cardiomediastinal silhouette. Lungs clear. No pneumothorax or effusion. Osseous thorax intact. No acute disease. RECOMMENDATION: Careful clinical correlation and follow up recommended. Discharge Medications:      Medication List        ASK your doctor about these medications      aspirin 81 MG chewable tablet  Take 1 tablet by mouth daily     metoprolol succinate 25 MG extended release tablet  Commonly known as: TOPROL XL  Take 1 tablet by mouth 2 times daily              Time Spent on discharge is more than 45 minutes in the examination, evaluation, counseling and review of medications and discharge plan.    +++++++++++++++++++++++++++++++++++++++++++++++++  Catalino Burciaga, [address]  +++++++++++++++++++++++++++++++++++++++++++++++++  NOTE: This report was transcribed using voice recognition software. Every effort was made to ensure accuracy; however, inadvertent computerized transcription errors may be present.

## 2022-11-17 NOTE — PROGRESS NOTES
CLINICAL PHARMACY NOTE: MEDS TO BEDS    Total # of Prescriptions Filled: 4   The following medications were delivered to the patient:  MAGNESIUM OXIDE 400 MG   ALDACTONE 25 MG   LIPITOR 40 MG  ENTRESTO 24/26 MG    Additional Documentation:

## 2022-11-22 ENCOUNTER — TELEPHONE (OUTPATIENT)
Dept: CARDIAC CATH/INVASIVE PROCEDURES | Age: 67
End: 2022-11-22

## 2022-11-22 NOTE — TELEPHONE ENCOUNTER
I called Mr. Yulissa Lucas to see how he's doing since his ICD insertion on 11/15/22. He states he's doing fine & that the aquacel dressing remains intact I reminded him to remove it today and to leave the skin glue intact-keep it clean and dry. He denies any fevers, redness, bleeding, drainage, or swelling at ICD incision site. I reminded him to continue to wear his sling at night for 4 weeks; along with not abducting the L arm above the shoulder. I also told him if he should receive shocks he should call our ep office as they are monitoring him for the time being and if he should receive 2 or more shocks he should report to his local ER. He said his primary care doctor is getting him referred to an EP Physician where he lives but doesn't have an appt yet. I told him to be sure to get that appt set up and to follow with his PRimary care for any concerns until he sees EP. He offers no complaints & is thankful for the phone call.

## 2022-12-09 ENCOUNTER — TELEPHONE (OUTPATIENT)
Dept: NON INVASIVE DIAGNOSTICS | Age: 67
End: 2022-12-09

## 2022-12-09 NOTE — TELEPHONE ENCOUNTER
Called the patient to advise him of his appointment on Monday he told me that he lives in [facility]. He said that he wanted to have a virtual visit. I told him since he does ot live in [facility] we cannot do the virtual visit. The patient will follow up with EP in the [facility].

## 2023-04-07 ENCOUNTER — TELEPHONE (OUTPATIENT)
Dept: NON INVASIVE DIAGNOSTICS | Age: 68
End: 2023-04-07

## 2023-04-07 NOTE — TELEPHONE ENCOUNTER
I received a call from [place], nurse, at [facility] device clinic, [place], [place]. [place] would like the patient transferred to their clinic. I put the transfer in [address].     Pradeep Bailey RN, BSN  [facility]

## 2024-05-08 ENCOUNTER — TELEPHONE (OUTPATIENT)
Dept: INTERNAL MEDICINE | Age: 69
End: 2024-05-08

## 2024-05-20 ENCOUNTER — APPOINTMENT (OUTPATIENT)
Dept: INTERNAL MEDICINE | Age: 69
End: 2024-05-20

## 2024-05-20 ENCOUNTER — LAB SERVICES (OUTPATIENT)
Dept: LAB | Age: 69
End: 2024-05-20

## 2024-05-20 VITALS
SYSTOLIC BLOOD PRESSURE: 132 MMHG | WEIGHT: 202.71 LBS | TEMPERATURE: 97.5 F | RESPIRATION RATE: 18 BRPM | HEIGHT: 71 IN | DIASTOLIC BLOOD PRESSURE: 80 MMHG | BODY MASS INDEX: 28.38 KG/M2 | OXYGEN SATURATION: 95 % | HEART RATE: 74 BPM

## 2024-05-20 DIAGNOSIS — Z12.11 ENCOUNTER FOR SCREENING FOR MALIGNANT NEOPLASM OF COLON: Primary | ICD-10-CM

## 2024-05-20 DIAGNOSIS — I50.42 CHRONIC COMBINED SYSTOLIC AND DIASTOLIC HEART FAILURE  (CMD): ICD-10-CM

## 2024-05-20 DIAGNOSIS — I25.10 CAD IN NATIVE ARTERY: ICD-10-CM

## 2024-05-20 DIAGNOSIS — I42.8 NONISCHEMIC CARDIOMYOPATHY  (CMD): ICD-10-CM

## 2024-05-20 DIAGNOSIS — M54.50 LUMBAR PAIN: ICD-10-CM

## 2024-05-20 DIAGNOSIS — Z95.810 ICD (IMPLANTABLE CARDIOVERTER-DEFIBRILLATOR), DUAL, IN SITU: ICD-10-CM

## 2024-05-20 DIAGNOSIS — M72.0 DUPUYTREN'S CONTRACTURE: ICD-10-CM

## 2024-05-20 PROBLEM — N40.0 BENIGN PROSTATIC HYPERPLASIA: Status: ACTIVE | Noted: 2023-01-06

## 2024-05-20 LAB
DEPRECATED RDW RBC: 40.6 FL (ref 39–50)
ERYTHROCYTE [DISTWIDTH] IN BLOOD: 12.1 % (ref 11–15)
HBA1C MFR BLD: 5.5 % (ref 4.5–5.6)
HCT VFR BLD CALC: 47.7 % (ref 39–51)
HGB BLD-MCNC: 15.9 G/DL (ref 13–17)
MCH RBC QN AUTO: 30.3 PG (ref 26–34)
MCHC RBC AUTO-ENTMCNC: 33.3 G/DL (ref 32–36.5)
MCV RBC AUTO: 91 FL (ref 78–100)
NRBC BLD MANUAL-RTO: 0 /100 WBC
PLATELET # BLD AUTO: 140 K/MCL (ref 140–450)
RBC # BLD: 5.24 MIL/MCL (ref 4.5–5.9)
WBC # BLD: 6.4 K/MCL (ref 4.2–11)

## 2024-05-20 PROCEDURE — 80061 LIPID PANEL: CPT | Performed by: CLINICAL MEDICAL LABORATORY

## 2024-05-20 PROCEDURE — 85027 COMPLETE CBC AUTOMATED: CPT | Performed by: CLINICAL MEDICAL LABORATORY

## 2024-05-20 PROCEDURE — 83036 HEMOGLOBIN GLYCOSYLATED A1C: CPT | Performed by: CLINICAL MEDICAL LABORATORY

## 2024-05-20 PROCEDURE — 36415 COLL VENOUS BLD VENIPUNCTURE: CPT | Performed by: INTERNAL MEDICINE

## 2024-05-20 PROCEDURE — 80053 COMPREHEN METABOLIC PANEL: CPT | Performed by: CLINICAL MEDICAL LABORATORY

## 2024-05-20 RX ORDER — AMIODARONE HYDROCHLORIDE 200 MG/1
TABLET ORAL
COMMUNITY
Start: 2024-02-20

## 2024-05-20 RX ORDER — METOPROLOL SUCCINATE 25 MG/1
TABLET, EXTENDED RELEASE ORAL
COMMUNITY

## 2024-05-20 RX ORDER — ATORVASTATIN CALCIUM 40 MG/1
40 TABLET, FILM COATED ORAL AT BEDTIME
COMMUNITY
Start: 2024-02-20

## 2024-05-20 RX ORDER — SPIRONOLACTONE 25 MG/1
25 TABLET ORAL DAILY
COMMUNITY
Start: 2024-05-15

## 2024-05-20 RX ORDER — SEMAGLUTIDE 0.68 MG/ML
INJECTION, SOLUTION SUBCUTANEOUS
COMMUNITY
Start: 2023-12-14 | End: 2024-05-20 | Stop reason: ALTCHOICE

## 2024-05-20 RX ORDER — ASPIRIN 81 MG/1
81 TABLET, COATED ORAL DAILY
COMMUNITY
Start: 2024-02-20

## 2024-05-20 RX ORDER — DAPAGLIFLOZIN 10 MG/1
10 TABLET, FILM COATED ORAL DAILY
COMMUNITY
Start: 2024-05-15

## 2024-05-20 RX ORDER — SACUBITRIL AND VALSARTAN 24; 26 MG/1; MG/1
TABLET, FILM COATED ORAL
COMMUNITY
Start: 2024-02-20

## 2024-05-20 ASSESSMENT — PAIN SCALES - GENERAL: PAINLEVEL: 5

## 2024-05-20 ASSESSMENT — ENCOUNTER SYMPTOMS
GASTROINTESTINAL NEGATIVE: 1
SHORTNESS OF BREATH: 1
ALLERGIC/IMMUNOLOGIC NEGATIVE: 1
HEMATOLOGIC/LYMPHATIC NEGATIVE: 1
FATIGUE: 1
EYES NEGATIVE: 1
PSYCHIATRIC NEGATIVE: 1
BACK PAIN: 1
NEUROLOGICAL NEGATIVE: 1
ENDOCRINE NEGATIVE: 1

## 2024-05-20 ASSESSMENT — COGNITIVE AND FUNCTIONAL STATUS - GENERAL
DO YOU HAVE DIFFICULTY DRESSING OR BATHING: NO
BECAUSE OF A PHYSICAL, MENTAL, OR EMOTIONAL CONDITION, DO YOU HAVE SERIOUS DIFFICULTY CONCENTRATING, REMEMBERING OR MAKING DECISIONS: YES
DO YOU HAVE SERIOUS DIFFICULTY WALKING OR CLIMBING STAIRS: NO
BECAUSE OF A PHYSICAL, MENTAL, OR EMOTIONAL CONDITION, DO YOU HAVE DIFFICULTY DOING ERRANDS ALONE: NO

## 2024-05-20 ASSESSMENT — PATIENT HEALTH QUESTIONNAIRE - PHQ9
SUM OF ALL RESPONSES TO PHQ9 QUESTIONS 1 AND 2: 2
2. FEELING DOWN, DEPRESSED OR HOPELESS: SEVERAL DAYS
SUM OF ALL RESPONSES TO PHQ9 QUESTIONS 1 AND 2: 2
CLINICAL INTERPRETATION OF PHQ2 SCORE: NO FURTHER SCREENING NEEDED
1. LITTLE INTEREST OR PLEASURE IN DOING THINGS: SEVERAL DAYS

## 2024-05-21 LAB
ALBUMIN SERPL-MCNC: 4 G/DL (ref 3.6–5.1)
ALBUMIN/GLOB SERPL: 1.3 {RATIO} (ref 1–2.4)
ALP SERPL-CCNC: 63 UNITS/L (ref 45–117)
ALT SERPL-CCNC: 27 UNITS/L
ANION GAP SERPL CALC-SCNC: 9 MMOL/L (ref 7–19)
AST SERPL-CCNC: 26 UNITS/L
BILIRUB SERPL-MCNC: 0.6 MG/DL (ref 0.2–1)
BUN SERPL-MCNC: 19 MG/DL (ref 6–20)
BUN/CREAT SERPL: 21 (ref 7–25)
CALCIUM SERPL-MCNC: 9.1 MG/DL (ref 8.4–10.2)
CHLORIDE SERPL-SCNC: 109 MMOL/L (ref 97–110)
CHOLEST SERPL-MCNC: 165 MG/DL
CHOLEST/HDLC SERPL: 3.2 {RATIO}
CO2 SERPL-SCNC: 26 MMOL/L (ref 21–32)
CREAT SERPL-MCNC: 0.91 MG/DL (ref 0.67–1.17)
EGFRCR SERPLBLD CKD-EPI 2021: >90 ML/MIN/{1.73_M2}
FASTING DURATION TIME PATIENT: ABNORMAL H
GLOBULIN SER-MCNC: 3 G/DL (ref 2–4)
GLUCOSE SERPL-MCNC: 107 MG/DL (ref 70–99)
HDLC SERPL-MCNC: 52 MG/DL
LDLC SERPL CALC-MCNC: 90 MG/DL
NONHDLC SERPL-MCNC: 113 MG/DL
POTASSIUM SERPL-SCNC: 4.1 MMOL/L (ref 3.4–5.1)
PROT SERPL-MCNC: 7 G/DL (ref 6.4–8.2)
SODIUM SERPL-SCNC: 140 MMOL/L (ref 135–145)
TRIGL SERPL-MCNC: 114 MG/DL

## 2024-05-30 ENCOUNTER — CLINICAL DOCUMENTATION (OUTPATIENT)
Dept: INTERNAL MEDICINE | Age: 69
End: 2024-05-30

## 2024-05-31 ENCOUNTER — TELEPHONE (OUTPATIENT)
Dept: INTERNAL MEDICINE | Age: 69
End: 2024-05-31

## 2024-06-03 ENCOUNTER — APPOINTMENT (OUTPATIENT)
Dept: INTERNAL MEDICINE | Age: 69
End: 2024-06-03

## 2024-06-03 VITALS
WEIGHT: 204.37 LBS | SYSTOLIC BLOOD PRESSURE: 136 MMHG | HEIGHT: 71 IN | BODY MASS INDEX: 28.61 KG/M2 | HEART RATE: 64 BPM | TEMPERATURE: 97 F | DIASTOLIC BLOOD PRESSURE: 60 MMHG | RESPIRATION RATE: 18 BRPM | OXYGEN SATURATION: 98 %

## 2024-06-03 DIAGNOSIS — M72.0 DUPUYTREN'S CONTRACTURE: Primary | ICD-10-CM

## 2024-06-03 DIAGNOSIS — I42.8 NONISCHEMIC CARDIOMYOPATHY  (CMD): ICD-10-CM

## 2024-06-03 DIAGNOSIS — J31.0 CHRONIC RHINITIS: ICD-10-CM

## 2024-06-03 DIAGNOSIS — I50.42 CHRONIC COMBINED SYSTOLIC AND DIASTOLIC HEART FAILURE  (CMD): ICD-10-CM

## 2024-06-03 PROCEDURE — 99213 OFFICE O/P EST LOW 20 MIN: CPT | Performed by: STUDENT IN AN ORGANIZED HEALTH CARE EDUCATION/TRAINING PROGRAM

## 2024-06-03 RX ORDER — SACUBITRIL AND VALSARTAN 24; 26 MG/1; MG/1
1 TABLET, FILM COATED ORAL 2 TIMES DAILY
Qty: 180 TABLET | Refills: 0 | Status: SHIPPED | OUTPATIENT
Start: 2024-06-03 | End: 2024-09-01

## 2024-06-03 RX ORDER — FLUTICASONE PROPIONATE 50 MCG
2 SPRAY, SUSPENSION (ML) NASAL DAILY
Qty: 16 G | Refills: 1 | Status: SHIPPED | OUTPATIENT
Start: 2024-06-03 | End: 2024-06-03

## 2024-06-03 RX ORDER — SPIRONOLACTONE 25 MG/1
25 TABLET ORAL DAILY
Qty: 30 TABLET | Refills: 2 | Status: SHIPPED | OUTPATIENT
Start: 2024-06-03 | End: 2024-09-01

## 2024-06-03 RX ORDER — DAPAGLIFLOZIN 10 MG/1
10 TABLET, FILM COATED ORAL DAILY
Qty: 90 TABLET | Refills: 0 | Status: SHIPPED | OUTPATIENT
Start: 2024-06-03 | End: 2024-09-01

## 2024-06-03 RX ORDER — FLUTICASONE PROPIONATE 50 MCG
2 SPRAY, SUSPENSION (ML) NASAL DAILY
Qty: 48 G | Refills: 1 | Status: SHIPPED | OUTPATIENT
Start: 2024-06-03

## 2024-06-03 RX ORDER — ATORVASTATIN CALCIUM 40 MG/1
40 TABLET, FILM COATED ORAL AT BEDTIME
Qty: 90 TABLET | Refills: 0 | Status: SHIPPED | OUTPATIENT
Start: 2024-06-03 | End: 2024-09-01

## 2024-06-03 RX ORDER — METOPROLOL SUCCINATE 25 MG/1
25 TABLET, EXTENDED RELEASE ORAL DAILY
Qty: 90 TABLET | Refills: 0 | Status: SHIPPED | OUTPATIENT
Start: 2024-06-03 | End: 2024-09-01

## 2024-06-03 ASSESSMENT — ENCOUNTER SYMPTOMS
UNEXPECTED WEIGHT CHANGE: 0
RHINORRHEA: 0
WEAKNESS: 0
COUGH: 0
CONSTIPATION: 0
ALLERGIC/IMMUNOLOGIC NEGATIVE: 1
HEADACHES: 0
WHEEZING: 0
NERVOUS/ANXIOUS: 0
CHILLS: 0
ENDOCRINE NEGATIVE: 1
NUMBNESS: 0
SORE THROAT: 0
DIARRHEA: 0
ABDOMINAL PAIN: 0
NAUSEA: 0
VOMITING: 0
FEVER: 0
SLEEP DISTURBANCE: 0
BLOOD IN STOOL: 0
FATIGUE: 0
SHORTNESS OF BREATH: 1

## 2024-06-03 ASSESSMENT — PATIENT HEALTH QUESTIONNAIRE - PHQ9
1. LITTLE INTEREST OR PLEASURE IN DOING THINGS: NOT AT ALL
SUM OF ALL RESPONSES TO PHQ9 QUESTIONS 1 AND 2: 0
SUM OF ALL RESPONSES TO PHQ9 QUESTIONS 1 AND 2: 0
2. FEELING DOWN, DEPRESSED OR HOPELESS: NOT AT ALL
CLINICAL INTERPRETATION OF PHQ2 SCORE: NO FURTHER SCREENING NEEDED

## 2024-06-03 ASSESSMENT — PAIN SCALES - GENERAL: PAINLEVEL: 4

## 2024-06-10 ENCOUNTER — TELEPHONE (OUTPATIENT)
Dept: INTERNAL MEDICINE | Age: 69
End: 2024-06-10

## 2024-06-23 PROBLEM — Z95.810 ICD (IMPLANTABLE CARDIOVERTER-DEFIBRILLATOR), DUAL, IN SITU: Chronic | Status: ACTIVE | Noted: 2023-03-17

## 2024-06-23 PROBLEM — I10 PRIMARY HYPERTENSION: Status: ACTIVE | Noted: 2024-06-23

## 2024-06-23 PROBLEM — I10 PRIMARY HYPERTENSION: Chronic | Status: ACTIVE | Noted: 2024-06-23

## 2024-06-23 PROBLEM — I42.8 NONISCHEMIC CARDIOMYOPATHY  (CMD): Chronic | Status: ACTIVE | Noted: 2022-11-11

## 2024-06-23 PROBLEM — E78.49 OTHER HYPERLIPIDEMIA: Chronic | Status: ACTIVE | Noted: 2024-06-23

## 2024-06-23 PROBLEM — I50.42 CHRONIC COMBINED SYSTOLIC AND DIASTOLIC HEART FAILURE  (CMD): Chronic | Status: ACTIVE | Noted: 2023-02-17

## 2024-06-23 PROBLEM — Z12.11 ENCOUNTER FOR SCREENING FOR MALIGNANT NEOPLASM OF COLON: Status: RESOLVED | Noted: 2024-05-20 | Resolved: 2024-06-23

## 2024-06-23 PROBLEM — N40.0 BENIGN PROSTATIC HYPERPLASIA: Chronic | Status: ACTIVE | Noted: 2023-01-06

## 2024-06-23 PROBLEM — E78.49 OTHER HYPERLIPIDEMIA: Status: ACTIVE | Noted: 2024-06-23

## 2024-06-24 ENCOUNTER — APPOINTMENT (OUTPATIENT)
Dept: INTERNAL MEDICINE | Age: 69
End: 2024-06-24

## 2024-06-24 VITALS
HEIGHT: 71 IN | HEART RATE: 82 BPM | OXYGEN SATURATION: 99 % | DIASTOLIC BLOOD PRESSURE: 82 MMHG | TEMPERATURE: 97.8 F | SYSTOLIC BLOOD PRESSURE: 132 MMHG | BODY MASS INDEX: 28.78 KG/M2 | WEIGHT: 205.58 LBS | RESPIRATION RATE: 14 BRPM

## 2024-06-24 DIAGNOSIS — I10 PRIMARY HYPERTENSION: Chronic | ICD-10-CM

## 2024-06-24 DIAGNOSIS — R07.9 CHEST PAIN, UNSPECIFIED TYPE: ICD-10-CM

## 2024-06-24 DIAGNOSIS — F32.0 MILD MAJOR DEPRESSION (CMD): Primary | ICD-10-CM

## 2024-06-24 DIAGNOSIS — I50.42 CHRONIC COMBINED SYSTOLIC AND DIASTOLIC HEART FAILURE  (CMD): Chronic | ICD-10-CM

## 2024-06-24 PROCEDURE — 3079F DIAST BP 80-89 MM HG: CPT | Performed by: STUDENT IN AN ORGANIZED HEALTH CARE EDUCATION/TRAINING PROGRAM

## 2024-06-24 PROCEDURE — G2211 COMPLEX E/M VISIT ADD ON: HCPCS | Performed by: STUDENT IN AN ORGANIZED HEALTH CARE EDUCATION/TRAINING PROGRAM

## 2024-06-24 PROCEDURE — 99214 OFFICE O/P EST MOD 30 MIN: CPT | Performed by: STUDENT IN AN ORGANIZED HEALTH CARE EDUCATION/TRAINING PROGRAM

## 2024-06-24 PROCEDURE — 3075F SYST BP GE 130 - 139MM HG: CPT | Performed by: STUDENT IN AN ORGANIZED HEALTH CARE EDUCATION/TRAINING PROGRAM

## 2024-06-24 RX ORDER — LOSARTAN POTASSIUM 50 MG/1
50 TABLET ORAL DAILY
Qty: 90 TABLET | Refills: 1 | Status: SHIPPED | OUTPATIENT
Start: 2024-06-24

## 2024-06-24 ASSESSMENT — PATIENT HEALTH QUESTIONNAIRE - PHQ9
CLINICAL INTERPRETATION OF PHQ2 SCORE: NO FURTHER SCREENING NEEDED
2. FEELING DOWN, DEPRESSED OR HOPELESS: NOT AT ALL
1. LITTLE INTEREST OR PLEASURE IN DOING THINGS: NOT AT ALL
SUM OF ALL RESPONSES TO PHQ9 QUESTIONS 1 AND 2: 0
SUM OF ALL RESPONSES TO PHQ9 QUESTIONS 1 AND 2: 0

## 2024-06-24 ASSESSMENT — PAIN SCALES - GENERAL: PAINLEVEL: 0

## 2024-06-26 ASSESSMENT — ENCOUNTER SYMPTOMS
DIARRHEA: 0
WEAKNESS: 0
HEMATOLOGIC/LYMPHATIC NEGATIVE: 1
PSYCHIATRIC NEGATIVE: 1
CONSTIPATION: 0
NAUSEA: 0
PHOTOPHOBIA: 0
POLYDIPSIA: 0
RHINORRHEA: 0
COUGH: 0
CHILLS: 0
NUMBNESS: 0
HEADACHES: 0
ALLERGIC/IMMUNOLOGIC NEGATIVE: 1
BLOOD IN STOOL: 0
FATIGUE: 0
VOMITING: 0
ABDOMINAL DISTENTION: 0
UNEXPECTED WEIGHT CHANGE: 0
FEVER: 0
SORE THROAT: 0
ABDOMINAL PAIN: 0

## 2024-06-27 ENCOUNTER — TELEPHONE (OUTPATIENT)
Dept: GASTROENTEROLOGY | Age: 69
End: 2024-06-27

## 2024-06-27 DIAGNOSIS — Z12.11 SPECIAL SCREENING FOR MALIGNANT NEOPLASMS, COLON: Primary | ICD-10-CM

## 2024-06-27 RX ORDER — BISACODYL 5 MG/1
5 TABLET, DELAYED RELEASE ORAL
Qty: 2 TABLET | Refills: 0 | Status: SHIPPED | OUTPATIENT
Start: 2024-06-27

## 2024-06-28 ENCOUNTER — TELEPHONE (OUTPATIENT)
Dept: GASTROENTEROLOGY | Age: 69
End: 2024-06-28

## 2024-06-28 ENCOUNTER — TELEPHONE (OUTPATIENT)
Dept: SURGERY | Age: 69
End: 2024-06-28

## 2024-07-05 ENCOUNTER — TELEPHONE (OUTPATIENT)
Dept: CARDIOLOGY | Age: 69
End: 2024-07-05

## 2024-07-08 ENCOUNTER — APPOINTMENT (OUTPATIENT)
Dept: CARDIOLOGY | Age: 69
End: 2024-07-08
Attending: STUDENT IN AN ORGANIZED HEALTH CARE EDUCATION/TRAINING PROGRAM

## 2024-07-08 PROBLEM — R07.9 CHEST PAIN: Status: ACTIVE | Noted: 2024-07-08

## 2024-07-08 ASSESSMENT — ENCOUNTER SYMPTOMS: SHORTNESS OF BREATH: 1

## 2024-07-10 ENCOUNTER — TELEPHONE (OUTPATIENT)
Dept: INTERNAL MEDICINE | Age: 69
End: 2024-07-10

## 2024-07-10 ENCOUNTER — HOSPITAL ENCOUNTER (OUTPATIENT)
Dept: CARDIOLOGY | Age: 69
Discharge: HOME | End: 2024-07-10
Attending: INTERNAL MEDICINE

## 2024-07-10 DIAGNOSIS — I42.8 NONISCHEMIC CARDIOMYOPATHY  (CMD): ICD-10-CM

## 2024-07-10 DIAGNOSIS — I50.42 CHRONIC COMBINED SYSTOLIC AND DIASTOLIC HEART FAILURE  (CMD): ICD-10-CM

## 2024-07-10 LAB
AORTIC VALVE AREA (AVA): 0.55
ASCENDING AORTA (AAD): 8
AV STENOSIS SEVERITY TEXT: NORMAL
AVI LVOT PEAK GRADIENT (LVOTMG): 1
E WAVE DECELARATION TIME (MDT): 8.11
LEFT INTERNAL DIMENSION IN SYSTOLE (LVSD): 1
LEFT VENTRICULAR INTERNAL DIMENSION IN DIASTOLE (LVDD): 3.8
LEFT VENTRICULAR POSTERIOR WALL IN END DIASTOLE (LVPW): 5.3
LV EF: NORMAL %
MV E TISSUE VEL MED (MESV): 8.29
MV E WAVE VEL/E TISSUE VEL MED(MSR): 6.82
MV PEAK E VELOCITY (MVPEV): 0.77
TRICUSPID VALVE ANNULAR PEAK VELOCITY (TVAPV): 32
TRICUSPID VALVE PEAK REGURGITATION VELOCITY (TRPV): 3.4

## 2024-07-10 PROCEDURE — 93306 TTE W/DOPPLER COMPLETE: CPT | Performed by: INTERNAL MEDICINE

## 2024-07-10 PROCEDURE — 93306 TTE W/DOPPLER COMPLETE: CPT

## 2024-07-11 ENCOUNTER — TELEPHONE (OUTPATIENT)
Dept: CARDIOLOGY | Age: 69
End: 2024-07-11

## 2024-07-12 ENCOUNTER — APPOINTMENT (OUTPATIENT)
Dept: GASTROENTEROLOGY | Age: 69
End: 2024-07-12
Attending: STUDENT IN AN ORGANIZED HEALTH CARE EDUCATION/TRAINING PROGRAM

## 2024-07-12 ENCOUNTER — HOSPITAL ENCOUNTER (OUTPATIENT)
Dept: CARDIOLOGY | Age: 69
End: 2024-07-12
Attending: STUDENT IN AN ORGANIZED HEALTH CARE EDUCATION/TRAINING PROGRAM

## 2024-07-16 ENCOUNTER — APPOINTMENT (OUTPATIENT)
Dept: INTERNAL MEDICINE | Age: 69
End: 2024-07-16

## 2024-07-16 VITALS
HEART RATE: 68 BPM | OXYGEN SATURATION: 96 % | TEMPERATURE: 99.2 F | BODY MASS INDEX: 28.13 KG/M2 | WEIGHT: 200.95 LBS | HEIGHT: 71 IN | DIASTOLIC BLOOD PRESSURE: 82 MMHG | SYSTOLIC BLOOD PRESSURE: 122 MMHG | RESPIRATION RATE: 14 BRPM

## 2024-07-16 DIAGNOSIS — H43.393 VITREOUS FLOATERS OF BOTH EYES: Primary | ICD-10-CM

## 2024-07-16 DIAGNOSIS — I25.10 CAD IN NATIVE ARTERY: ICD-10-CM

## 2024-07-16 DIAGNOSIS — I50.42 CHRONIC COMBINED SYSTOLIC AND DIASTOLIC HEART FAILURE  (CMD): ICD-10-CM

## 2024-07-16 DIAGNOSIS — I42.8 NONISCHEMIC CARDIOMYOPATHY  (CMD): ICD-10-CM

## 2024-07-16 PROCEDURE — 3079F DIAST BP 80-89 MM HG: CPT | Performed by: STUDENT IN AN ORGANIZED HEALTH CARE EDUCATION/TRAINING PROGRAM

## 2024-07-16 PROCEDURE — 3074F SYST BP LT 130 MM HG: CPT | Performed by: STUDENT IN AN ORGANIZED HEALTH CARE EDUCATION/TRAINING PROGRAM

## 2024-07-16 PROCEDURE — 99214 OFFICE O/P EST MOD 30 MIN: CPT | Performed by: STUDENT IN AN ORGANIZED HEALTH CARE EDUCATION/TRAINING PROGRAM

## 2024-07-16 RX ORDER — ATORVASTATIN CALCIUM 40 MG/1
40 TABLET, FILM COATED ORAL AT BEDTIME
Qty: 90 TABLET | Refills: 1 | Status: SHIPPED | OUTPATIENT
Start: 2024-07-16

## 2024-07-16 RX ORDER — POLYETHYLENE GLYCOL 3350, SODIUM CHLORIDE, SODIUM BICARBONATE, POTASSIUM CHLORIDE 420; 11.2; 5.72; 1.48 G/4L; G/4L; G/4L; G/4L
POWDER, FOR SOLUTION ORAL
COMMUNITY
Start: 2024-06-27

## 2024-07-16 RX ORDER — METOPROLOL SUCCINATE 25 MG/1
25 TABLET, EXTENDED RELEASE ORAL DAILY
Qty: 90 TABLET | Refills: 1 | Status: SHIPPED | OUTPATIENT
Start: 2024-07-16

## 2024-07-16 RX ORDER — ASPIRIN 81 MG/1
81 TABLET, COATED ORAL DAILY
Qty: 90 TABLET | Refills: 1 | Status: SHIPPED | OUTPATIENT
Start: 2024-07-16

## 2024-07-16 ASSESSMENT — ENCOUNTER SYMPTOMS
CONSTIPATION: 0
PHOTOPHOBIA: 0
FATIGUE: 0
SHORTNESS OF BREATH: 0
HEMATOLOGIC/LYMPHATIC NEGATIVE: 1
POLYDIPSIA: 0
SORE THROAT: 0
ALLERGIC/IMMUNOLOGIC NEGATIVE: 1
UNEXPECTED WEIGHT CHANGE: 0
PSYCHIATRIC NEGATIVE: 1
FEVER: 0
WEAKNESS: 0
CHILLS: 0
RHINORRHEA: 0
VOMITING: 0
BLOOD IN STOOL: 0
NAUSEA: 0
COUGH: 0
ABDOMINAL DISTENTION: 0
ABDOMINAL PAIN: 0
NUMBNESS: 0
DIARRHEA: 0
HEADACHES: 0

## 2024-07-16 ASSESSMENT — PATIENT HEALTH QUESTIONNAIRE - PHQ9
SUM OF ALL RESPONSES TO PHQ9 QUESTIONS 1 AND 2: 1
CLINICAL INTERPRETATION OF PHQ2 SCORE: NO FURTHER SCREENING NEEDED
SUM OF ALL RESPONSES TO PHQ9 QUESTIONS 1 AND 2: 1
2. FEELING DOWN, DEPRESSED OR HOPELESS: SEVERAL DAYS
1. LITTLE INTEREST OR PLEASURE IN DOING THINGS: NOT AT ALL

## 2024-07-16 ASSESSMENT — PAIN SCALES - GENERAL: PAINLEVEL: 5

## 2024-07-19 ENCOUNTER — TELEPHONE (OUTPATIENT)
Dept: CARDIOLOGY | Age: 69
End: 2024-07-19

## 2024-07-22 ENCOUNTER — TELEPHONE (OUTPATIENT)
Dept: INTERNAL MEDICINE | Age: 69
End: 2024-07-22

## 2024-07-22 DIAGNOSIS — M72.0 DUPUYTREN'S CONTRACTURE: Primary | ICD-10-CM

## 2024-07-24 ENCOUNTER — HOSPITAL ENCOUNTER (EMERGENCY)
Age: 69
Discharge: STILL ADMITTED OR ADMINISTRATIVE | End: 2024-07-24

## 2024-07-24 ENCOUNTER — HOSPITAL ENCOUNTER (OUTPATIENT)
Age: 69
Setting detail: OBSERVATION
LOS: 1 days | Discharge: HOME | End: 2024-07-25
Attending: STUDENT IN AN ORGANIZED HEALTH CARE EDUCATION/TRAINING PROGRAM | Admitting: STUDENT IN AN ORGANIZED HEALTH CARE EDUCATION/TRAINING PROGRAM

## 2024-07-24 ENCOUNTER — HOSPITAL ENCOUNTER (OUTPATIENT)
Dept: CARDIOLOGY | Age: 69
Discharge: HOME | End: 2024-07-24
Attending: STUDENT IN AN ORGANIZED HEALTH CARE EDUCATION/TRAINING PROGRAM

## 2024-07-24 ENCOUNTER — HOSPITAL ENCOUNTER (OUTPATIENT)
Dept: NUCLEAR MEDICINE | Age: 69
Discharge: HOME | End: 2024-07-24
Attending: STUDENT IN AN ORGANIZED HEALTH CARE EDUCATION/TRAINING PROGRAM

## 2024-07-24 VITALS — BODY MASS INDEX: 28 KG/M2 | HEIGHT: 71 IN | WEIGHT: 200 LBS | RESPIRATION RATE: 16 BRPM

## 2024-07-24 DIAGNOSIS — R94.39 POSITIVE CARDIAC STRESS TEST: ICD-10-CM

## 2024-07-24 DIAGNOSIS — F32.A DEPRESSION, UNSPECIFIED DEPRESSION TYPE: ICD-10-CM

## 2024-07-24 DIAGNOSIS — R07.9 CHEST PAIN, UNSPECIFIED TYPE: ICD-10-CM

## 2024-07-24 DIAGNOSIS — F43.9 TRAUMA AND STRESSOR-RELATED DISORDER: Primary | ICD-10-CM

## 2024-07-24 LAB
ALBUMIN SERPL-MCNC: 3.6 G/DL (ref 3.6–5.1)
ALBUMIN/GLOB SERPL: 1.1 {RATIO} (ref 1–2.4)
ALP SERPL-CCNC: 60 UNITS/L (ref 45–117)
ALT SERPL-CCNC: 28 UNITS/L
ANION GAP SERPL CALC-SCNC: 8 MMOL/L (ref 7–19)
AST SERPL-CCNC: 28 UNITS/L
BASOPHILS # BLD: 0 K/MCL (ref 0–0.3)
BASOPHILS NFR BLD: 1 %
BILIRUB SERPL-MCNC: 0.6 MG/DL (ref 0.2–1)
BUN SERPL-MCNC: 16 MG/DL (ref 6–20)
BUN/CREAT SERPL: 19 (ref 7–25)
CALCIUM SERPL-MCNC: 9 MG/DL (ref 8.4–10.2)
CHLORIDE SERPL-SCNC: 110 MMOL/L (ref 97–110)
CO2 SERPL-SCNC: 24 MMOL/L (ref 21–32)
CREAT SERPL-MCNC: 0.83 MG/DL (ref 0.67–1.17)
DEPRECATED RDW RBC: 39.5 FL (ref 39–50)
EGFRCR SERPLBLD CKD-EPI 2021: >90 ML/MIN/{1.73_M2}
EOSINOPHIL # BLD: 0.1 K/MCL (ref 0–0.5)
EOSINOPHIL NFR BLD: 2 %
ERYTHROCYTE [DISTWIDTH] IN BLOOD: 12.3 % (ref 11–15)
FASTING DURATION TIME PATIENT: ABNORMAL H
GLOBULIN SER-MCNC: 3.3 G/DL (ref 2–4)
GLUCOSE SERPL-MCNC: 121 MG/DL (ref 70–99)
HCT VFR BLD CALC: 44.4 % (ref 39–51)
HGB BLD-MCNC: 14.8 G/DL (ref 13–17)
IMM GRANULOCYTES # BLD AUTO: 0 K/MCL (ref 0–0.2)
IMM GRANULOCYTES # BLD: 0 %
LYMPHOCYTES # BLD: 1.8 K/MCL (ref 1–4)
LYMPHOCYTES NFR BLD: 31 %
MAGNESIUM SERPL-MCNC: 2.1 MG/DL (ref 1.7–2.4)
MCH RBC QN AUTO: 29.5 PG (ref 26–34)
MCHC RBC AUTO-ENTMCNC: 33.3 G/DL (ref 32–36.5)
MCV RBC AUTO: 88.4 FL (ref 78–100)
MONOCYTES # BLD: 0.8 K/MCL (ref 0.3–0.9)
MONOCYTES NFR BLD: 14 %
NEUTROPHILS # BLD: 3.1 K/MCL (ref 1.8–7.7)
NEUTROPHILS NFR BLD: 52 %
NRBC BLD MANUAL-RTO: 0 /100 WBC
PHOSPHATE SERPL-MCNC: 2.5 MG/DL (ref 2.4–4.7)
PLATELET # BLD AUTO: 158 K/MCL (ref 140–450)
POTASSIUM SERPL-SCNC: 4.3 MMOL/L (ref 3.4–5.1)
PROT SERPL-MCNC: 6.9 G/DL (ref 6.4–8.2)
RBC # BLD: 5.02 MIL/MCL (ref 4.5–5.9)
SODIUM SERPL-SCNC: 138 MMOL/L (ref 135–145)
TROPONIN I SERPL DL<=0.01 NG/ML-MCNC: 21 NG/L
WBC # BLD: 6 K/MCL (ref 4.2–11)

## 2024-07-24 PROCEDURE — 84484 ASSAY OF TROPONIN QUANT: CPT

## 2024-07-24 PROCEDURE — 93010 ELECTROCARDIOGRAM REPORT: CPT | Performed by: INTERNAL MEDICINE

## 2024-07-24 PROCEDURE — 10006031 HB ROOM CHARGE TELEMETRY

## 2024-07-24 PROCEDURE — 78452 HT MUSCLE IMAGE SPECT MULT: CPT | Performed by: INTERNAL MEDICINE

## 2024-07-24 PROCEDURE — X1094 NO CHARGE VISIT: HCPCS

## 2024-07-24 PROCEDURE — 93005 ELECTROCARDIOGRAM TRACING: CPT

## 2024-07-24 PROCEDURE — 10002803 HB RX 637

## 2024-07-24 PROCEDURE — 80053 COMPREHEN METABOLIC PANEL: CPT

## 2024-07-24 PROCEDURE — 93016 CV STRESS TEST SUPVJ ONLY: CPT | Performed by: INTERNAL MEDICINE

## 2024-07-24 PROCEDURE — 83735 ASSAY OF MAGNESIUM: CPT

## 2024-07-24 PROCEDURE — 10006150 HB RX 343: Performed by: STUDENT IN AN ORGANIZED HEALTH CARE EDUCATION/TRAINING PROGRAM

## 2024-07-24 PROCEDURE — 78452 HT MUSCLE IMAGE SPECT MULT: CPT

## 2024-07-24 PROCEDURE — 85025 COMPLETE CBC W/AUTO DIFF WBC: CPT

## 2024-07-24 PROCEDURE — 93018 CV STRESS TEST I&R ONLY: CPT | Performed by: INTERNAL MEDICINE

## 2024-07-24 PROCEDURE — 84100 ASSAY OF PHOSPHORUS: CPT

## 2024-07-24 PROCEDURE — 36415 COLL VENOUS BLD VENIPUNCTURE: CPT

## 2024-07-24 PROCEDURE — 10002800 HB RX 250 W HCPCS: Performed by: STUDENT IN AN ORGANIZED HEALTH CARE EDUCATION/TRAINING PROGRAM

## 2024-07-24 PROCEDURE — A9500 TC99M SESTAMIBI: HCPCS | Performed by: STUDENT IN AN ORGANIZED HEALTH CARE EDUCATION/TRAINING PROGRAM

## 2024-07-24 PROCEDURE — 93017 CV STRESS TEST TRACING ONLY: CPT

## 2024-07-24 RX ORDER — 0.9 % SODIUM CHLORIDE 0.9 %
2 VIAL (ML) INJECTION EVERY 12 HOURS SCHEDULED
Status: DISCONTINUED | OUTPATIENT
Start: 2024-07-25 | End: 2024-07-25 | Stop reason: HOSPADM

## 2024-07-24 RX ORDER — ATORVASTATIN CALCIUM 40 MG/1
40 TABLET, FILM COATED ORAL AT BEDTIME
Status: DISCONTINUED | OUTPATIENT
Start: 2024-07-25 | End: 2024-07-24

## 2024-07-24 RX ORDER — LOSARTAN POTASSIUM 50 MG/1
50 TABLET ORAL DAILY
Status: DISCONTINUED | OUTPATIENT
Start: 2024-07-25 | End: 2024-07-25 | Stop reason: HOSPADM

## 2024-07-24 RX ORDER — ATORVASTATIN CALCIUM 40 MG/1
40 TABLET, FILM COATED ORAL AT BEDTIME
Status: DISCONTINUED | OUTPATIENT
Start: 2024-07-24 | End: 2024-07-25 | Stop reason: HOSPADM

## 2024-07-24 RX ORDER — ACETAMINOPHEN 325 MG/1
650 TABLET ORAL EVERY 4 HOURS PRN
Status: DISCONTINUED | OUTPATIENT
Start: 2024-07-24 | End: 2024-07-25 | Stop reason: HOSPADM

## 2024-07-24 RX ORDER — TETRAKIS(2-METHOXYISOBUTYLISOCYANIDE)COPPER(I) TETRAFLUOROBORATE 1 MG/ML
11 INJECTION, POWDER, LYOPHILIZED, FOR SOLUTION INTRAVENOUS ONCE
Status: COMPLETED | OUTPATIENT
Start: 2024-07-24 | End: 2024-07-24

## 2024-07-24 RX ORDER — ASPIRIN 81 MG/1
81 TABLET ORAL DAILY
Status: DISCONTINUED | OUTPATIENT
Start: 2024-07-25 | End: 2024-07-25 | Stop reason: HOSPADM

## 2024-07-24 RX ORDER — TETRAKIS(2-METHOXYISOBUTYLISOCYANIDE)COPPER(I) TETRAFLUOROBORATE 1 MG/ML
33 INJECTION, POWDER, LYOPHILIZED, FOR SOLUTION INTRAVENOUS ONCE
Status: COMPLETED | OUTPATIENT
Start: 2024-07-24 | End: 2024-07-24

## 2024-07-24 RX ORDER — REGADENOSON 0.08 MG/ML
0.4 INJECTION, SOLUTION INTRAVENOUS ONCE
Status: COMPLETED | OUTPATIENT
Start: 2024-07-24 | End: 2024-07-24

## 2024-07-24 RX ADMIN — TETRAKIS(2-METHOXYISOBUTYLISOCYANIDE)COPPER(I) TETRAFLUOROBORATE 11 MILLICURIE: 1 INJECTION, POWDER, LYOPHILIZED, FOR SOLUTION INTRAVENOUS at 07:20

## 2024-07-24 RX ADMIN — REGADENOSON 0.4 MG: 0.08 INJECTION, SOLUTION INTRAVENOUS at 08:25

## 2024-07-24 RX ADMIN — TETRAKIS(2-METHOXYISOBUTYLISOCYANIDE)COPPER(I) TETRAFLUOROBORATE 33 MILLICURIE: 1 INJECTION, POWDER, LYOPHILIZED, FOR SOLUTION INTRAVENOUS at 08:33

## 2024-07-24 SDOH — SOCIAL STABILITY: SOCIAL INSECURITY: HOW OFTEN DOES ANYONE, INCLUDING FAMILY AND FRIENDS, INSULT OR TALK DOWN TO YOU?: NEVER

## 2024-07-24 SDOH — SOCIAL STABILITY: SOCIAL NETWORK

## 2024-07-24 SDOH — SOCIAL STABILITY: SOCIAL INSECURITY: HOW OFTEN DOES ANYONE, INCLUDING FAMILY AND FRIENDS, SCREAM OR CURSE AT YOU?: NEVER

## 2024-07-24 SDOH — ECONOMIC STABILITY: GENERAL

## 2024-07-24 SDOH — ECONOMIC STABILITY: TRANSPORTATION INSECURITY
IN THE PAST 12 MONTHS, HAS LACK OF RELIABLE TRANSPORTATION KEPT YOU FROM MEDICAL APPOINTMENTS, MEETINGS, WORK OR FROM GETTING THINGS NEEDED FOR DAILY LIVING?: YES

## 2024-07-24 SDOH — ECONOMIC STABILITY: FOOD INSECURITY: WITHIN THE PAST 12 MONTHS, THE FOOD YOU BOUGHT JUST DIDN'T LAST AND YOU DIDN'T HAVE MONEY TO GET MORE.: NEVER TRUE

## 2024-07-24 SDOH — ECONOMIC STABILITY: HOUSING INSECURITY: WHAT IS YOUR LIVING SITUATION TODAY?: I HAVE A STEADY PLACE TO LIVE

## 2024-07-24 SDOH — HEALTH STABILITY: GENERAL: BECAUSE OF A PHYSICAL, MENTAL, OR EMOTIONAL CONDITION, DO YOU HAVE DIFFICULTY DOING ERRANDS ALONE?: YES

## 2024-07-24 SDOH — HEALTH STABILITY: PHYSICAL HEALTH: DO YOU HAVE DIFFICULTY DRESSING OR BATHING?: NO

## 2024-07-24 SDOH — SOCIAL STABILITY: SOCIAL INSECURITY: HOW OFTEN DOES ANYONE, INCLUDING FAMILY AND FRIENDS, PHYSICALLY HURT YOU?: NEVER

## 2024-07-24 SDOH — SOCIAL STABILITY: SOCIAL INSECURITY: HOW OFTEN DOES ANYONE, INCLUDING FAMILY AND FRIENDS, THREATEN YOU WITH HARM?: NEVER

## 2024-07-24 SDOH — ECONOMIC STABILITY: HOUSING INSECURITY: WHAT IS YOUR LIVING SITUATION TODAY?: ALONE

## 2024-07-24 SDOH — ECONOMIC STABILITY: GENERAL: WOULD YOU LIKE HELP WITH ANY OF THE FOLLOWING NEEDS?: I DON'T WANT HELP WITH ANY OF THESE

## 2024-07-24 SDOH — HEALTH STABILITY: GENERAL
BECAUSE OF A PHYSICAL, MENTAL, OR EMOTIONAL CONDITION, DO YOU HAVE SERIOUS DIFFICULTY CONCENTRATING, REMEMBERING OR MAKING DECISIONS?: NO

## 2024-07-24 SDOH — ECONOMIC STABILITY: HOUSING INSECURITY: DO YOU HAVE PROBLEMS WITH ANY OF THE FOLLOWING?: NONE OF THE ABOVE

## 2024-07-24 SDOH — SOCIAL STABILITY: SOCIAL NETWORK
HOW OFTEN DO YOU SEE OR TALK TO PEOPLE THAT YOU CARE ABOUT AND FEEL CLOSE TO? (FOR EXAMPLE: TALKING TO FRIENDS ON THE PHONE, VISITING FRIENDS OR FAMILY, GOING TO CHURCH OR CLUB MEETINGS): LESS THAN ONCE A WEEK

## 2024-07-24 SDOH — ECONOMIC STABILITY: HOUSING INSECURITY: WHAT IS YOUR LIVING SITUATION TODAY?: HOUSE

## 2024-07-24 SDOH — ECONOMIC STABILITY: INCOME INSECURITY
IN THE PAST 12 MONTHS, HAS THE ELECTRIC, GAS, OIL, OR WATER COMPANY THREATENED TO SHUT OFF SERVICE IN YOUR HOME?: PATIENT DECLINED

## 2024-07-24 SDOH — HEALTH STABILITY: PHYSICAL HEALTH: DO YOU HAVE SERIOUS DIFFICULTY WALKING OR CLIMBING STAIRS?: NO

## 2024-07-24 ASSESSMENT — ORIENTATION MEMORY CONCENTRATION TEST (OMCT)
WHAT YEAR IS IT NOW (MUST BE EXACT): CORRECT
WHAT MONTH IS IT NOW: CORRECT
SAY THE MONTHS IN REVERSE ORDER STARTING WITH LAST MONTH: CORRECT
OMCT INTERPRETATION: 0-6: NO SIGNIFICANT IMPAIRMENT
REPEAT THE NAME AND ADDRESS I ASKED YOU TO REMEMBER: CORRECT
WHAT TIME IS IT (NO WATCH OR CLOCK): CORRECT
OMCT SCORE: 0
COUNT BACKWARDS FROM 20 TO 1: CORRECT

## 2024-07-24 ASSESSMENT — ACTIVITIES OF DAILY LIVING (ADL)
RECENT_DECLINE_ADL: YES, ACUTE ILLNESS WITHOUT THERAPY NEEDS
ADL_SCORE: 12
ADL_BEFORE_ADMISSION: INDEPENDENT
ADL_SHORT_OF_BREATH: YES

## 2024-07-24 ASSESSMENT — PAIN SCALES - GENERAL
PAINLEVEL_OUTOF10: 0
PAINLEVEL_OUTOF10: 0

## 2024-07-24 ASSESSMENT — COLUMBIA-SUICIDE SEVERITY RATING SCALE - C-SSRS
IS THE PATIENT ABLE TO COMPLETE C-SSRS: YES
6. HAVE YOU EVER DONE ANYTHING, STARTED TO DO ANYTHING, OR PREPARED TO DO ANYTHING TO END YOUR LIFE?: NO
1. WITHIN THE PAST MONTH, HAVE YOU WISHED YOU WERE DEAD OR WISHED YOU COULD GO TO SLEEP AND NOT WAKE UP?: NO
2. HAVE YOU ACTUALLY HAD ANY THOUGHTS OF KILLING YOURSELF?: NO

## 2024-07-24 ASSESSMENT — PATIENT HEALTH QUESTIONNAIRE - PHQ9
SUM OF ALL RESPONSES TO PHQ9 QUESTIONS 1 AND 2: 1
CLINICAL INTERPRETATION OF PHQ2 SCORE: NO FURTHER SCREENING NEEDED
SUM OF ALL RESPONSES TO PHQ9 QUESTIONS 1 AND 2: 1
2. FEELING DOWN, DEPRESSED OR HOPELESS: SEVERAL DAYS
IS PATIENT ABLE TO COMPLETE PHQ2 OR PHQ9: YES
1. LITTLE INTEREST OR PLEASURE IN DOING THINGS: NOT AT ALL

## 2024-07-24 ASSESSMENT — LIFESTYLE VARIABLES
HOW OFTEN DO YOU HAVE 6 OR MORE DRINKS ON ONE OCCASION: LESS THAN MONTHLY
AUDIT-C TOTAL SCORE: 2
HOW MANY STANDARD DRINKS CONTAINING ALCOHOL DO YOU HAVE ON A TYPICAL DAY: 0,1 OR 2
HOW OFTEN DO YOU HAVE A DRINK CONTAINING ALCOHOL: MONTHLY OR LESS

## 2024-07-25 ENCOUNTER — APPOINTMENT (OUTPATIENT)
Dept: CARDIOLOGY | Age: 69
End: 2024-07-25

## 2024-07-25 VITALS
OXYGEN SATURATION: 98 % | BODY MASS INDEX: 28.83 KG/M2 | DIASTOLIC BLOOD PRESSURE: 84 MMHG | RESPIRATION RATE: 16 BRPM | HEIGHT: 71 IN | HEART RATE: 61 BPM | TEMPERATURE: 97.5 F | WEIGHT: 205.91 LBS | SYSTOLIC BLOOD PRESSURE: 153 MMHG

## 2024-07-25 LAB
ATRIAL RATE (BPM): 79
QRS-INTERVAL (MSEC): 190
QT-INTERVAL (MSEC): 446
QTC: 511
R AXIS (DEGREES): 162
REPORT TEXT: NORMAL
T AXIS (DEGREES): 11
TROPONIN I SERPL DL<=0.01 NG/ML-MCNC: 22 NG/L
VENTRICULAR RATE EKG/MIN (BPM): 79

## 2024-07-25 PROCEDURE — 36415 COLL VENOUS BLD VENIPUNCTURE: CPT | Performed by: INTERNAL MEDICINE

## 2024-07-25 PROCEDURE — 84484 ASSAY OF TROPONIN QUANT: CPT | Performed by: INTERNAL MEDICINE

## 2024-07-25 PROCEDURE — 93283 PRGRMG EVAL IMPLANTABLE DFB: CPT

## 2024-07-25 PROCEDURE — 10004651 HB RX, NO CHARGE ITEM

## 2024-07-25 PROCEDURE — 10002803 HB RX 637

## 2024-07-25 PROCEDURE — 99239 HOSP IP/OBS DSCHRG MGMT >30: CPT | Performed by: STUDENT IN AN ORGANIZED HEALTH CARE EDUCATION/TRAINING PROGRAM

## 2024-07-25 PROCEDURE — 99223 1ST HOSP IP/OBS HIGH 75: CPT | Performed by: INTERNAL MEDICINE

## 2024-07-25 PROCEDURE — G0378 HOSPITAL OBSERVATION PER HR: HCPCS

## 2024-07-25 PROCEDURE — 93283 PRGRMG EVAL IMPLANTABLE DFB: CPT | Performed by: INTERNAL MEDICINE

## 2024-07-25 RX ADMIN — SODIUM CHLORIDE, PRESERVATIVE FREE 2 ML: 5 INJECTION INTRAVENOUS at 09:42

## 2024-07-25 RX ADMIN — ATORVASTATIN CALCIUM 40 MG: 40 TABLET, FILM COATED ORAL at 00:25

## 2024-07-25 RX ADMIN — ASPIRIN 81 MG: 81 TABLET, COATED ORAL at 09:42

## 2024-07-25 RX ADMIN — LOSARTAN POTASSIUM 50 MG: 50 TABLET, FILM COATED ORAL at 09:42

## 2024-07-25 ASSESSMENT — PAIN SCALES - GENERAL
PAINLEVEL_OUTOF10: 0

## 2024-07-26 ENCOUNTER — TELEPHONE (OUTPATIENT)
Dept: INTERNAL MEDICINE | Age: 69
End: 2024-07-26

## 2024-08-02 ENCOUNTER — APPOINTMENT (OUTPATIENT)
Dept: INTERNAL MEDICINE | Age: 69
End: 2024-08-02

## 2024-08-02 VITALS
SYSTOLIC BLOOD PRESSURE: 138 MMHG | RESPIRATION RATE: 16 BRPM | WEIGHT: 203.71 LBS | BODY MASS INDEX: 28.52 KG/M2 | OXYGEN SATURATION: 97 % | DIASTOLIC BLOOD PRESSURE: 82 MMHG | HEART RATE: 82 BPM | HEIGHT: 71 IN | TEMPERATURE: 97.4 F

## 2024-08-02 DIAGNOSIS — I25.10 CAD IN NATIVE ARTERY: ICD-10-CM

## 2024-08-02 DIAGNOSIS — I50.42 CHRONIC COMBINED SYSTOLIC AND DIASTOLIC HEART FAILURE  (CMD): Primary | Chronic | ICD-10-CM

## 2024-08-02 DIAGNOSIS — M72.0 CONTRACTURE OF PALMAR FASCIA: ICD-10-CM

## 2024-08-02 RX ORDER — ASPIRIN 81 MG/1
81 TABLET, COATED ORAL DAILY
Qty: 90 TABLET | Refills: 3 | Status: SHIPPED | OUTPATIENT
Start: 2024-08-02

## 2024-08-02 RX ORDER — LOSARTAN POTASSIUM 50 MG/1
50 TABLET ORAL DAILY
Qty: 90 TABLET | Refills: 3 | Status: SHIPPED | OUTPATIENT
Start: 2024-08-02

## 2024-08-02 RX ORDER — METOPROLOL SUCCINATE 25 MG/1
25 TABLET, EXTENDED RELEASE ORAL DAILY
Qty: 90 TABLET | Refills: 3 | Status: SHIPPED | OUTPATIENT
Start: 2024-08-02

## 2024-08-02 RX ORDER — ATORVASTATIN CALCIUM 40 MG/1
40 TABLET, FILM COATED ORAL AT BEDTIME
Qty: 90 TABLET | Refills: 1 | Status: SHIPPED | OUTPATIENT
Start: 2024-08-02

## 2024-08-02 ASSESSMENT — PATIENT HEALTH QUESTIONNAIRE - PHQ9
2. FEELING DOWN, DEPRESSED OR HOPELESS: SEVERAL DAYS
CLINICAL INTERPRETATION OF PHQ2 SCORE: NO FURTHER SCREENING NEEDED
SUM OF ALL RESPONSES TO PHQ9 QUESTIONS 1 AND 2: 2
1. LITTLE INTEREST OR PLEASURE IN DOING THINGS: SEVERAL DAYS
SUM OF ALL RESPONSES TO PHQ9 QUESTIONS 1 AND 2: 2

## 2024-08-02 ASSESSMENT — PAIN SCALES - GENERAL: PAINLEVEL: 4

## 2024-08-03 ENCOUNTER — TELEPHONE (OUTPATIENT)
Dept: CARE COORDINATION | Age: 69
End: 2024-08-03

## 2024-08-04 ENCOUNTER — TELEPHONE (OUTPATIENT)
Dept: CARE COORDINATION | Age: 69
End: 2024-08-04

## 2024-08-07 ENCOUNTER — APPOINTMENT (OUTPATIENT)
Dept: CARDIOLOGY | Age: 69
End: 2024-08-07
Attending: INTERNAL MEDICINE

## 2024-08-07 ENCOUNTER — TELEPHONE (OUTPATIENT)
Dept: CARDIOLOGY | Age: 69
End: 2024-08-07

## 2024-08-07 VITALS
WEIGHT: 205.03 LBS | DIASTOLIC BLOOD PRESSURE: 74 MMHG | BODY MASS INDEX: 28.6 KG/M2 | HEART RATE: 72 BPM | SYSTOLIC BLOOD PRESSURE: 126 MMHG

## 2024-08-07 DIAGNOSIS — Z86.74 HISTORY OF CARDIAC ARREST: ICD-10-CM

## 2024-08-07 DIAGNOSIS — I25.10 MILD CAD: ICD-10-CM

## 2024-08-07 DIAGNOSIS — I47.20 VENTRICULAR TACHYCARDIA  (CMD): ICD-10-CM

## 2024-08-07 DIAGNOSIS — Z95.810 ICD (IMPLANTABLE CARDIOVERTER-DEFIBRILLATOR) IN PLACE: ICD-10-CM

## 2024-08-07 DIAGNOSIS — I42.8 NONISCHEMIC CARDIOMYOPATHY  (CMD): ICD-10-CM

## 2024-08-07 DIAGNOSIS — E78.2 MIXED DYSLIPIDEMIA: ICD-10-CM

## 2024-08-07 DIAGNOSIS — I10 PRIMARY HYPERTENSION: ICD-10-CM

## 2024-08-07 DIAGNOSIS — I50.42 CHRONIC COMBINED SYSTOLIC AND DIASTOLIC HEART FAILURE  (CMD): Primary | ICD-10-CM

## 2024-08-07 RX ORDER — SPIRONOLACTONE 25 MG/1
25 TABLET ORAL DAILY
Qty: 90 TABLET | Refills: 3 | Status: SHIPPED | OUTPATIENT
Start: 2024-08-07

## 2024-08-10 ENCOUNTER — TELEPHONE (OUTPATIENT)
Dept: CARE COORDINATION | Age: 69
End: 2024-08-10

## 2024-08-17 ENCOUNTER — TELEPHONE (OUTPATIENT)
Dept: CARE COORDINATION | Age: 69
End: 2024-08-17

## 2024-08-20 ENCOUNTER — LAB SERVICES (OUTPATIENT)
Dept: LAB | Age: 69
End: 2024-08-20

## 2024-08-20 DIAGNOSIS — I50.42 CHRONIC COMBINED SYSTOLIC AND DIASTOLIC HEART FAILURE  (CMD): ICD-10-CM

## 2024-08-20 DIAGNOSIS — Z95.810 ICD (IMPLANTABLE CARDIOVERTER-DEFIBRILLATOR) IN PLACE: ICD-10-CM

## 2024-08-20 DIAGNOSIS — I47.20 VENTRICULAR TACHYCARDIA  (CMD): ICD-10-CM

## 2024-08-20 DIAGNOSIS — I25.10 MILD CAD: ICD-10-CM

## 2024-08-20 DIAGNOSIS — E78.2 MIXED DYSLIPIDEMIA: ICD-10-CM

## 2024-08-20 DIAGNOSIS — Z86.74 HISTORY OF CARDIAC ARREST: ICD-10-CM

## 2024-08-20 DIAGNOSIS — I10 PRIMARY HYPERTENSION: ICD-10-CM

## 2024-08-20 DIAGNOSIS — I42.8 NONISCHEMIC CARDIOMYOPATHY  (CMD): ICD-10-CM

## 2024-08-20 PROCEDURE — 80048 BASIC METABOLIC PNL TOTAL CA: CPT | Performed by: CLINICAL MEDICAL LABORATORY

## 2024-08-20 PROCEDURE — 36415 COLL VENOUS BLD VENIPUNCTURE: CPT | Performed by: INTERNAL MEDICINE

## 2024-08-21 LAB
ANION GAP SERPL CALC-SCNC: 14 MMOL/L (ref 7–19)
BUN SERPL-MCNC: 14 MG/DL (ref 6–20)
BUN/CREAT SERPL: 14 (ref 7–25)
CALCIUM SERPL-MCNC: 9 MG/DL (ref 8.4–10.2)
CHLORIDE SERPL-SCNC: 109 MMOL/L (ref 97–110)
CO2 SERPL-SCNC: 22 MMOL/L (ref 21–32)
CREAT SERPL-MCNC: 1 MG/DL (ref 0.67–1.17)
EGFRCR SERPLBLD CKD-EPI 2021: 82 ML/MIN/{1.73_M2}
FASTING DURATION TIME PATIENT: ABNORMAL H
GLUCOSE SERPL-MCNC: 122 MG/DL (ref 70–99)
POTASSIUM SERPL-SCNC: 4 MMOL/L (ref 3.4–5.1)
SODIUM SERPL-SCNC: 141 MMOL/L (ref 135–145)

## 2024-08-24 ENCOUNTER — TELEPHONE (OUTPATIENT)
Dept: CARE COORDINATION | Age: 69
End: 2024-08-24

## 2024-09-04 ENCOUNTER — APPOINTMENT (OUTPATIENT)
Dept: RHEUMATOLOGY | Age: 69
End: 2024-09-04
Attending: INTERNAL MEDICINE

## 2024-09-30 ENCOUNTER — APPOINTMENT (OUTPATIENT)
Dept: CARDIOLOGY | Age: 69
End: 2024-09-30
Attending: INTERNAL MEDICINE

## 2024-09-30 ENCOUNTER — APPOINTMENT (OUTPATIENT)
Dept: CARDIOLOGY | Age: 69
End: 2024-09-30

## 2024-09-30 VITALS
WEIGHT: 205.03 LBS | SYSTOLIC BLOOD PRESSURE: 130 MMHG | BODY MASS INDEX: 28.7 KG/M2 | HEART RATE: 66 BPM | DIASTOLIC BLOOD PRESSURE: 82 MMHG | HEIGHT: 71 IN

## 2024-09-30 DIAGNOSIS — I50.42 CHRONIC COMBINED SYSTOLIC AND DIASTOLIC HEART FAILURE  (CMD): Chronic | ICD-10-CM

## 2024-09-30 DIAGNOSIS — E78.49 OTHER HYPERLIPIDEMIA: Chronic | ICD-10-CM

## 2024-09-30 DIAGNOSIS — Z95.810 ICD (IMPLANTABLE CARDIOVERTER-DEFIBRILLATOR), DUAL, IN SITU: Primary | Chronic | ICD-10-CM

## 2024-09-30 DIAGNOSIS — I42.8 NONISCHEMIC CARDIOMYOPATHY  (CMD): Chronic | ICD-10-CM

## 2024-09-30 LAB
MDC_IDC_EPISODE_DTM: NORMAL
MDC_IDC_EPISODE_DURATION: 1 S
MDC_IDC_EPISODE_DURATION: 1 S
MDC_IDC_EPISODE_DURATION: 2 S
MDC_IDC_EPISODE_ID: 1
MDC_IDC_EPISODE_ID: 2
MDC_IDC_EPISODE_ID: 3
MDC_IDC_EPISODE_TYPE: NORMAL
MDC_IDC_EPISODE_TYPE_INDUCED: NO
MDC_IDC_LEAD_CONNECTION_STATUS: NORMAL
MDC_IDC_LEAD_CONNECTION_STATUS: NORMAL
MDC_IDC_LEAD_IMPLANT_DT: NORMAL
MDC_IDC_LEAD_IMPLANT_DT: NORMAL
MDC_IDC_LEAD_LOCATION: NORMAL
MDC_IDC_LEAD_LOCATION: NORMAL
MDC_IDC_LEAD_LOCATION_DETAIL_1: NORMAL
MDC_IDC_LEAD_LOCATION_DETAIL_1: NORMAL
MDC_IDC_LEAD_MFG: NORMAL
MDC_IDC_LEAD_MFG: NORMAL
MDC_IDC_LEAD_MODEL: NORMAL
MDC_IDC_LEAD_MODEL: NORMAL
MDC_IDC_LEAD_POLARITY_TYPE: NORMAL
MDC_IDC_LEAD_POLARITY_TYPE: NORMAL
MDC_IDC_LEAD_SERIAL: NORMAL
MDC_IDC_LEAD_SERIAL: NORMAL
MDC_IDC_MSMT_BATTERY_DTM: NORMAL
MDC_IDC_MSMT_BATTERY_REMAINING_LONGEVITY: 110 MO
MDC_IDC_MSMT_BATTERY_RRT_TRIGGER: 2.73
MDC_IDC_MSMT_BATTERY_STATUS: NORMAL
MDC_IDC_MSMT_BATTERY_VOLTAGE: 3.02 V
MDC_IDC_MSMT_LEADCHNL_RA_IMPEDANCE_VALUE: 570 OHM
MDC_IDC_MSMT_LEADCHNL_RA_PACING_THRESHOLD_AMPLITUDE: 0.5 V
MDC_IDC_MSMT_LEADCHNL_RA_PACING_THRESHOLD_PULSEWIDTH: 0.4 MS
MDC_IDC_MSMT_LEADCHNL_RA_SENSING_INTR_AMPL: 3.75 MV
MDC_IDC_MSMT_LEADCHNL_RA_SENSING_INTR_AMPL: 4.62 MV
MDC_IDC_MSMT_LEADCHNL_RV_IMPEDANCE_VALUE: 437 OHM
MDC_IDC_MSMT_LEADCHNL_RV_IMPEDANCE_VALUE: 532 OHM
MDC_IDC_MSMT_LEADCHNL_RV_PACING_THRESHOLD_AMPLITUDE: 0.75 V
MDC_IDC_MSMT_LEADCHNL_RV_PACING_THRESHOLD_PULSEWIDTH: 0.4 MS
MDC_IDC_MSMT_LEADCHNL_RV_SENSING_INTR_AMPL: 7.12 MV
MDC_IDC_MSMT_LEADCHNL_RV_SENSING_INTR_AMPL: 9.5 MV
MDC_IDC_PG_IMPLANT_DTM: NORMAL
MDC_IDC_PG_MFG: NORMAL
MDC_IDC_PG_MODEL: NORMAL
MDC_IDC_PG_SERIAL: NORMAL
MDC_IDC_PG_TYPE: NORMAL
MDC_IDC_SESS_CLINIC_NAME: NORMAL
MDC_IDC_SESS_DTM: NORMAL
MDC_IDC_SESS_TYPE: NORMAL
MDC_IDC_SET_BRADY_AT_MODE_SWITCH_RATE: 171 {BEATS}/MIN
MDC_IDC_SET_BRADY_HYSTRATE: NORMAL
MDC_IDC_SET_BRADY_LOWRATE: 60 {BEATS}/MIN
MDC_IDC_SET_BRADY_MAX_SENSOR_RATE: 120 {BEATS}/MIN
MDC_IDC_SET_BRADY_MAX_TRACKING_RATE: 130 {BEATS}/MIN
MDC_IDC_SET_BRADY_MODE: NORMAL
MDC_IDC_SET_BRADY_PAV_DELAY_LOW: 300 MS
MDC_IDC_SET_BRADY_SAV_DELAY_LOW: 270 MS
MDC_IDC_SET_LEADCHNL_RA_PACING_AMPLITUDE: 1.5 V
MDC_IDC_SET_LEADCHNL_RA_PACING_ANODE_ELECTRODE_1: NORMAL
MDC_IDC_SET_LEADCHNL_RA_PACING_ANODE_LOCATION_1: NORMAL
MDC_IDC_SET_LEADCHNL_RA_PACING_CAPTURE_MODE: NORMAL
MDC_IDC_SET_LEADCHNL_RA_PACING_CATHODE_ELECTRODE_1: NORMAL
MDC_IDC_SET_LEADCHNL_RA_PACING_CATHODE_LOCATION_1: NORMAL
MDC_IDC_SET_LEADCHNL_RA_PACING_POLARITY: NORMAL
MDC_IDC_SET_LEADCHNL_RA_PACING_PULSEWIDTH: 0.4 MS
MDC_IDC_SET_LEADCHNL_RA_SENSING_ANODE_ELECTRODE_1: NORMAL
MDC_IDC_SET_LEADCHNL_RA_SENSING_ANODE_LOCATION_1: NORMAL
MDC_IDC_SET_LEADCHNL_RA_SENSING_CATHODE_ELECTRODE_1: NORMAL
MDC_IDC_SET_LEADCHNL_RA_SENSING_CATHODE_LOCATION_1: NORMAL
MDC_IDC_SET_LEADCHNL_RA_SENSING_POLARITY: NORMAL
MDC_IDC_SET_LEADCHNL_RA_SENSING_SENSITIVITY: 0.3 MV
MDC_IDC_SET_LEADCHNL_RV_PACING_AMPLITUDE: 2 V
MDC_IDC_SET_LEADCHNL_RV_PACING_ANODE_ELECTRODE_1: NORMAL
MDC_IDC_SET_LEADCHNL_RV_PACING_ANODE_LOCATION_1: NORMAL
MDC_IDC_SET_LEADCHNL_RV_PACING_CAPTURE_MODE: NORMAL
MDC_IDC_SET_LEADCHNL_RV_PACING_CATHODE_ELECTRODE_1: NORMAL
MDC_IDC_SET_LEADCHNL_RV_PACING_CATHODE_LOCATION_1: NORMAL
MDC_IDC_SET_LEADCHNL_RV_PACING_POLARITY: NORMAL
MDC_IDC_SET_LEADCHNL_RV_PACING_PULSEWIDTH: 0.4 MS
MDC_IDC_SET_LEADCHNL_RV_SENSING_ANODE_ELECTRODE_1: NORMAL
MDC_IDC_SET_LEADCHNL_RV_SENSING_ANODE_LOCATION_1: NORMAL
MDC_IDC_SET_LEADCHNL_RV_SENSING_CATHODE_ELECTRODE_1: NORMAL
MDC_IDC_SET_LEADCHNL_RV_SENSING_CATHODE_LOCATION_1: NORMAL
MDC_IDC_SET_LEADCHNL_RV_SENSING_POLARITY: NORMAL
MDC_IDC_SET_LEADCHNL_RV_SENSING_SENSITIVITY: 0.45 MV
MDC_IDC_SET_ZONE_DETECTION_BEATS_DENOMINATOR: 16 {BEATS}
MDC_IDC_SET_ZONE_DETECTION_BEATS_DENOMINATOR: 32 {BEATS}
MDC_IDC_SET_ZONE_DETECTION_BEATS_DENOMINATOR: 32 {BEATS}
MDC_IDC_SET_ZONE_DETECTION_BEATS_NUMERATOR: 16 {BEATS}
MDC_IDC_SET_ZONE_DETECTION_BEATS_NUMERATOR: 24 {BEATS}
MDC_IDC_SET_ZONE_DETECTION_BEATS_NUMERATOR: 32 {BEATS}
MDC_IDC_SET_ZONE_DETECTION_INTERVAL: 260 MS
MDC_IDC_SET_ZONE_DETECTION_INTERVAL: 350 MS
MDC_IDC_SET_ZONE_DETECTION_INTERVAL: 350 MS
MDC_IDC_SET_ZONE_DETECTION_INTERVAL: 380 MS
MDC_IDC_SET_ZONE_DETECTION_INTERVAL: NORMAL
MDC_IDC_SET_ZONE_STATUS: NORMAL
MDC_IDC_SET_ZONE_TYPE: NORMAL
MDC_IDC_SET_ZONE_VENDOR_TYPE: NORMAL
MDC_IDC_STAT_AT_BURDEN_PERCENT: 0 %
MDC_IDC_STAT_AT_DTM_END: NORMAL
MDC_IDC_STAT_AT_DTM_START: NORMAL
MDC_IDC_STAT_BRADY_AP_VP_PERCENT: 0.02 %
MDC_IDC_STAT_BRADY_AP_VS_PERCENT: 39.74 %
MDC_IDC_STAT_BRADY_AS_VP_PERCENT: 0.04 %
MDC_IDC_STAT_BRADY_AS_VS_PERCENT: 60.2 %
MDC_IDC_STAT_BRADY_DTM_END: NORMAL
MDC_IDC_STAT_BRADY_DTM_START: NORMAL
MDC_IDC_STAT_BRADY_RA_PERCENT_PACED: 35.88 %
MDC_IDC_STAT_BRADY_RV_PERCENT_PACED: 0.05 %
MDC_IDC_STAT_EPISODE_RECENT_COUNT: 0
MDC_IDC_STAT_EPISODE_RECENT_COUNT: 3
MDC_IDC_STAT_EPISODE_RECENT_COUNT_DTM_END: NORMAL
MDC_IDC_STAT_EPISODE_RECENT_COUNT_DTM_START: NORMAL
MDC_IDC_STAT_EPISODE_TOTAL_COUNT: 0
MDC_IDC_STAT_EPISODE_TOTAL_COUNT: 3
MDC_IDC_STAT_EPISODE_TOTAL_COUNT_DTM_END: NORMAL
MDC_IDC_STAT_EPISODE_TOTAL_COUNT_DTM_START: NORMAL
MDC_IDC_STAT_EPISODE_TYPE: NORMAL
MDC_IDC_STAT_TACHYTHERAPY_ATP_DELIVERED_RECENT: 0
MDC_IDC_STAT_TACHYTHERAPY_ATP_DELIVERED_TOTAL: 0
MDC_IDC_STAT_TACHYTHERAPY_RECENT_DTM_END: NORMAL
MDC_IDC_STAT_TACHYTHERAPY_RECENT_DTM_START: NORMAL
MDC_IDC_STAT_TACHYTHERAPY_SHOCKS_ABORTED_RECENT: 0
MDC_IDC_STAT_TACHYTHERAPY_SHOCKS_ABORTED_TOTAL: 0
MDC_IDC_STAT_TACHYTHERAPY_SHOCKS_DELIVERED_RECENT: 0
MDC_IDC_STAT_TACHYTHERAPY_SHOCKS_DELIVERED_TOTAL: 0
MDC_IDC_STAT_TACHYTHERAPY_TOTAL_DTM_END: NORMAL
MDC_IDC_STAT_TACHYTHERAPY_TOTAL_DTM_START: NORMAL

## 2024-10-01 DIAGNOSIS — I42.8 NONISCHEMIC CARDIOMYOPATHY  (CMD): Chronic | ICD-10-CM

## 2024-10-01 DIAGNOSIS — E78.49 OTHER HYPERLIPIDEMIA: Chronic | ICD-10-CM

## 2024-10-01 DIAGNOSIS — I50.42 CHRONIC COMBINED SYSTOLIC AND DIASTOLIC HEART FAILURE  (CMD): Chronic | ICD-10-CM

## 2024-10-01 DIAGNOSIS — Z95.810 ICD (IMPLANTABLE CARDIOVERTER-DEFIBRILLATOR), DUAL, IN SITU: Chronic | ICD-10-CM

## 2024-10-01 PROCEDURE — 93000 ELECTROCARDIOGRAM COMPLETE: CPT | Performed by: INTERNAL MEDICINE

## 2024-10-23 ENCOUNTER — APPOINTMENT (OUTPATIENT)
Dept: INTERNAL MEDICINE | Age: 69
End: 2024-10-23

## 2024-10-23 VITALS
RESPIRATION RATE: 14 BRPM | WEIGHT: 204.15 LBS | HEART RATE: 77 BPM | BODY MASS INDEX: 28.58 KG/M2 | DIASTOLIC BLOOD PRESSURE: 80 MMHG | HEIGHT: 71 IN | SYSTOLIC BLOOD PRESSURE: 144 MMHG | TEMPERATURE: 98.6 F | OXYGEN SATURATION: 99 %

## 2024-10-23 DIAGNOSIS — I10 PRIMARY HYPERTENSION: Chronic | ICD-10-CM

## 2024-10-23 DIAGNOSIS — Z00.00 MEDICARE ANNUAL WELLNESS VISIT, INITIAL: ICD-10-CM

## 2024-10-23 DIAGNOSIS — M54.50 LUMBAR BACK PAIN: ICD-10-CM

## 2024-10-23 DIAGNOSIS — I25.10 CAD IN NATIVE ARTERY: Primary | ICD-10-CM

## 2024-10-23 PROBLEM — R07.9 CHEST PAIN: Status: RESOLVED | Noted: 2024-07-08 | Resolved: 2024-10-23

## 2024-10-23 PROBLEM — R94.39 POSITIVE CARDIAC STRESS TEST: Status: RESOLVED | Noted: 2024-07-24 | Resolved: 2024-10-23

## 2024-10-23 RX ORDER — ROSUVASTATIN CALCIUM 10 MG/1
10 TABLET, COATED ORAL DAILY
Qty: 90 TABLET | Refills: 3 | Status: SHIPPED | OUTPATIENT
Start: 2024-10-23

## 2024-10-23 ASSESSMENT — VISUAL ACUITY
OS_CC: 20/50
OD_CC: 20/100

## 2024-10-23 ASSESSMENT — PATIENT HEALTH QUESTIONNAIRE - PHQ9
1. LITTLE INTEREST OR PLEASURE IN DOING THINGS: NOT AT ALL
SUM OF ALL RESPONSES TO PHQ9 QUESTIONS 1 AND 2: 1
2. FEELING DOWN, DEPRESSED OR HOPELESS: SEVERAL DAYS
SUM OF ALL RESPONSES TO PHQ9 QUESTIONS 1 AND 2: 1
CLINICAL INTERPRETATION OF PHQ2 SCORE: NO FURTHER SCREENING NEEDED

## 2024-10-23 ASSESSMENT — PAIN SCALES - GENERAL: PAINLEVEL: 8

## 2024-10-29 ENCOUNTER — APPOINTMENT (OUTPATIENT)
Dept: REHABILITATION | Age: 69
End: 2024-10-29
Attending: STUDENT IN AN ORGANIZED HEALTH CARE EDUCATION/TRAINING PROGRAM

## 2024-10-29 DIAGNOSIS — M54.50 LUMBAR BACK PAIN: Primary | ICD-10-CM

## 2024-10-29 ASSESSMENT — ENCOUNTER SYMPTOMS
PAIN LOCATION: LOW BACK
QUALITY: SHARP
QUALITY: SHOOTING
SUBJECTIVE PAIN PROGRESSION: WORSENING
PAIN SEVERITY NOW: 6
ALLEVIATING FACTORS: REST
PAIN FREQUENCY: CONSTANT

## 2024-11-01 ENCOUNTER — NURSE TRIAGE (OUTPATIENT)
Dept: TELEHEALTH | Age: 69
End: 2024-11-01

## 2024-11-04 ASSESSMENT — ENCOUNTER SYMPTOMS
COUGH: 0
PHOTOPHOBIA: 0
ABDOMINAL PAIN: 0
FATIGUE: 0
ALLERGIC/IMMUNOLOGIC NEGATIVE: 1
ABDOMINAL DISTENTION: 0
NUMBNESS: 0
VOMITING: 0
NAUSEA: 0
RHINORRHEA: 0
SHORTNESS OF BREATH: 0
UNEXPECTED WEIGHT CHANGE: 0
POLYDIPSIA: 0
CONSTIPATION: 0
HEMATOLOGIC/LYMPHATIC NEGATIVE: 1
FEVER: 0
HEADACHES: 0
SORE THROAT: 0
BLOOD IN STOOL: 0
DIARRHEA: 0
BACK PAIN: 1
WEAKNESS: 0
CHILLS: 0
PSYCHIATRIC NEGATIVE: 1

## 2024-11-08 ENCOUNTER — TELEPHONE (OUTPATIENT)
Dept: CARDIOLOGY | Age: 69
End: 2024-11-08

## 2024-11-08 ENCOUNTER — TELEPHONE (OUTPATIENT)
Dept: OTHER | Age: 69
End: 2024-11-08

## 2024-11-11 ENCOUNTER — APPOINTMENT (OUTPATIENT)
Dept: REHABILITATION | Age: 69
End: 2024-11-11

## 2024-11-11 DIAGNOSIS — M54.50 LUMBAR BACK PAIN: Primary | ICD-10-CM

## 2024-11-13 ENCOUNTER — APPOINTMENT (OUTPATIENT)
Dept: CARDIOLOGY | Age: 69
End: 2024-11-13

## 2024-11-13 VITALS
OXYGEN SATURATION: 98 % | WEIGHT: 198.75 LBS | DIASTOLIC BLOOD PRESSURE: 81 MMHG | BODY MASS INDEX: 27.82 KG/M2 | HEIGHT: 71 IN | HEART RATE: 74 BPM | SYSTOLIC BLOOD PRESSURE: 146 MMHG

## 2024-11-13 DIAGNOSIS — E78.49 OTHER HYPERLIPIDEMIA: ICD-10-CM

## 2024-11-13 DIAGNOSIS — R07.89 ATYPICAL CHEST PAIN: Primary | ICD-10-CM

## 2024-11-13 DIAGNOSIS — E78.2 MIXED DYSLIPIDEMIA: ICD-10-CM

## 2024-11-13 DIAGNOSIS — I47.20 VENTRICULAR TACHYCARDIA  (CMD): ICD-10-CM

## 2024-11-13 DIAGNOSIS — Z86.74 HISTORY OF CARDIAC ARREST: ICD-10-CM

## 2024-11-13 DIAGNOSIS — I42.8 NONISCHEMIC CARDIOMYOPATHY  (CMD): ICD-10-CM

## 2024-11-13 DIAGNOSIS — Z95.810 ICD (IMPLANTABLE CARDIOVERTER-DEFIBRILLATOR) IN PLACE: ICD-10-CM

## 2024-11-13 DIAGNOSIS — E55.9 VITAMIN D DEFICIENCY: ICD-10-CM

## 2024-11-13 DIAGNOSIS — I50.42 CHRONIC COMBINED SYSTOLIC AND DIASTOLIC HEART FAILURE  (CMD): ICD-10-CM

## 2024-11-13 DIAGNOSIS — I10 PRIMARY HYPERTENSION: ICD-10-CM

## 2024-11-13 DIAGNOSIS — I25.10 MILD CAD: ICD-10-CM

## 2024-11-14 ENCOUNTER — APPOINTMENT (OUTPATIENT)
Dept: REHABILITATION | Age: 69
End: 2024-11-14

## 2024-11-14 DIAGNOSIS — M54.50 LUMBAR BACK PAIN: Primary | ICD-10-CM

## 2024-11-14 ASSESSMENT — ENCOUNTER SYMPTOMS: PAIN SEVERITY NOW: 6

## 2024-11-15 ENCOUNTER — HOSPITAL ENCOUNTER (OUTPATIENT)
Dept: GENERAL RADIOLOGY | Age: 69
End: 2024-11-15
Attending: INTERNAL MEDICINE

## 2024-11-15 DIAGNOSIS — R07.89 ATYPICAL CHEST PAIN: ICD-10-CM

## 2024-11-15 DIAGNOSIS — M54.50 LUMBAR PAIN: ICD-10-CM

## 2024-11-15 PROCEDURE — 72100 X-RAY EXAM L-S SPINE 2/3 VWS: CPT

## 2024-11-15 PROCEDURE — 93000 ELECTROCARDIOGRAM COMPLETE: CPT | Performed by: INTERNAL MEDICINE

## 2024-11-18 ENCOUNTER — APPOINTMENT (OUTPATIENT)
Dept: REHABILITATION | Age: 69
End: 2024-11-18

## 2024-11-18 DIAGNOSIS — M54.50 LUMBAR BACK PAIN: Primary | ICD-10-CM

## 2024-11-18 ASSESSMENT — ENCOUNTER SYMPTOMS: PAIN SEVERITY NOW: 3

## 2024-11-19 ENCOUNTER — HOSPITAL ENCOUNTER (OUTPATIENT)
Dept: CARDIOLOGY | Age: 69
Discharge: HOME | End: 2024-11-19
Attending: INTERNAL MEDICINE

## 2024-11-19 DIAGNOSIS — I47.20 VENTRICULAR TACHYCARDIA  (CMD): ICD-10-CM

## 2024-11-19 DIAGNOSIS — I50.42 CHRONIC COMBINED SYSTOLIC AND DIASTOLIC HEART FAILURE  (CMD): ICD-10-CM

## 2024-11-19 DIAGNOSIS — I42.8 NONISCHEMIC CARDIOMYOPATHY  (CMD): ICD-10-CM

## 2024-11-19 LAB
AORTIC VALVE AREA (AVA): 0.51
AV STENOSIS SEVERITY TEXT: NORMAL
AVI LVOT PEAK GRADIENT (LVOTMG): 1.1
E WAVE DECELARATION TIME (MDT): 7.12
LEFT INTERNAL DIMENSION IN SYSTOLE (LVSD): 1.2
LEFT VENTRICULAR INTERNAL DIMENSION IN DIASTOLE (LVDD): 4.2
LEFT VENTRICULAR POSTERIOR WALL IN END DIASTOLE (LVPW): 5.8
LV EF: NORMAL %
MV E TISSUE VEL MED (MESV): 12.1
MV E WAVE VEL/E TISSUE VEL MED(MSR): 7.21
MV PEAK A VELOCITY (MVPAV): 222
MV PEAK E VELOCITY (MVPEV): 0.91
TRICUSPID VALVE PEAK REGURGITATION VELOCITY (TRPV): 3.7

## 2024-11-19 PROCEDURE — 93306 TTE W/DOPPLER COMPLETE: CPT | Performed by: INTERNAL MEDICINE

## 2024-11-19 PROCEDURE — 93306 TTE W/DOPPLER COMPLETE: CPT

## 2024-11-20 ENCOUNTER — LAB SERVICES (OUTPATIENT)
Dept: LAB | Age: 69
End: 2024-11-20

## 2024-11-20 DIAGNOSIS — Z95.810 ICD (IMPLANTABLE CARDIOVERTER-DEFIBRILLATOR) IN PLACE: ICD-10-CM

## 2024-11-20 DIAGNOSIS — E55.9 VITAMIN D DEFICIENCY: ICD-10-CM

## 2024-11-20 DIAGNOSIS — I10 PRIMARY HYPERTENSION: ICD-10-CM

## 2024-11-20 DIAGNOSIS — E78.49 OTHER HYPERLIPIDEMIA: ICD-10-CM

## 2024-11-20 DIAGNOSIS — I50.42 CHRONIC COMBINED SYSTOLIC AND DIASTOLIC HEART FAILURE  (CMD): ICD-10-CM

## 2024-11-20 DIAGNOSIS — I42.8 NONISCHEMIC CARDIOMYOPATHY  (CMD): ICD-10-CM

## 2024-11-20 DIAGNOSIS — I47.20 VENTRICULAR TACHYCARDIA  (CMD): ICD-10-CM

## 2024-11-20 DIAGNOSIS — E78.2 MIXED DYSLIPIDEMIA: ICD-10-CM

## 2024-11-20 DIAGNOSIS — R07.89 ATYPICAL CHEST PAIN: ICD-10-CM

## 2024-11-20 DIAGNOSIS — I25.10 MILD CAD: ICD-10-CM

## 2024-11-20 DIAGNOSIS — Z86.74 HISTORY OF CARDIAC ARREST: ICD-10-CM

## 2024-11-20 LAB
25(OH)D3+25(OH)D2 SERPL-MCNC: 25.2 NG/ML (ref 30–100)
ALBUMIN SERPL-MCNC: 3.8 G/DL (ref 3.4–5)
ALBUMIN/GLOB SERPL: 1.3 {RATIO} (ref 1–2.4)
ALP SERPL-CCNC: 55 UNITS/L (ref 45–117)
ALT SERPL-CCNC: 21 UNITS/L
ANION GAP SERPL CALC-SCNC: 10 MMOL/L (ref 7–19)
AST SERPL-CCNC: 21 UNITS/L
BASOPHILS # BLD: 0.1 K/MCL (ref 0–0.3)
BASOPHILS NFR BLD: 1 %
BILIRUB SERPL-MCNC: 0.5 MG/DL (ref 0.2–1)
BUN SERPL-MCNC: 19 MG/DL (ref 6–20)
BUN/CREAT SERPL: 23 (ref 7–25)
CALCIUM SERPL-MCNC: 9.2 MG/DL (ref 8.4–10.2)
CHLORIDE SERPL-SCNC: 107 MMOL/L (ref 97–110)
CHOLEST SERPL-MCNC: 163 MG/DL
CHOLEST/HDLC SERPL: 3.1 {RATIO}
CO2 SERPL-SCNC: 26 MMOL/L (ref 21–32)
CREAT SERPL-MCNC: 0.82 MG/DL (ref 0.67–1.17)
DEPRECATED RDW RBC: 42 FL (ref 39–50)
EGFRCR SERPLBLD CKD-EPI 2021: >90 ML/MIN/{1.73_M2}
EOSINOPHIL # BLD: 0.3 K/MCL (ref 0–0.5)
EOSINOPHIL NFR BLD: 4 %
ERYTHROCYTE [DISTWIDTH] IN BLOOD: 13 % (ref 11–15)
FASTING DURATION TIME PATIENT: ABNORMAL H
FOLATE SERPL-MCNC: 9.2 NG/ML
GLOBULIN SER-MCNC: 2.9 G/DL (ref 2–4)
GLUCOSE SERPL-MCNC: 116 MG/DL (ref 70–99)
HCT VFR BLD CALC: 46.3 % (ref 39–51)
HDLC SERPL-MCNC: 52 MG/DL
HGB BLD-MCNC: 15.5 G/DL (ref 13–17)
IMM GRANULOCYTES # BLD AUTO: 0 K/MCL (ref 0–0.2)
IMM GRANULOCYTES # BLD: 0 %
LDLC SERPL CALC-MCNC: 85 MG/DL
LYMPHOCYTES # BLD: 2 K/MCL (ref 1–4)
LYMPHOCYTES NFR BLD: 30 %
MAGNESIUM SERPL-MCNC: 1.9 MG/DL (ref 1.7–2.4)
MCH RBC QN AUTO: 29.8 PG (ref 26–34)
MCHC RBC AUTO-ENTMCNC: 33.5 G/DL (ref 32–36.5)
MCV RBC AUTO: 88.9 FL (ref 78–100)
MONOCYTES # BLD: 0.9 K/MCL (ref 0.3–0.9)
MONOCYTES NFR BLD: 15 %
NEUTROPHILS # BLD: 3.3 K/MCL (ref 1.8–7.7)
NEUTROPHILS NFR BLD: 50 %
NONHDLC SERPL-MCNC: 111 MG/DL
NRBC BLD MANUAL-RTO: 0 /100 WBC
PLATELET # BLD AUTO: 146 K/MCL (ref 140–450)
POTASSIUM SERPL-SCNC: 4 MMOL/L (ref 3.4–5.1)
PROT SERPL-MCNC: 6.7 G/DL (ref 6.4–8.2)
RBC # BLD: 5.21 MIL/MCL (ref 4.5–5.9)
SODIUM SERPL-SCNC: 139 MMOL/L (ref 135–145)
TRIGL SERPL-MCNC: 129 MG/DL
TSH SERPL-ACNC: 2.42 MCUNITS/ML (ref 0.35–5)
VIT B12 SERPL-MCNC: 394 PG/ML (ref 211–911)
WBC # BLD: 6.5 K/MCL (ref 4.2–11)

## 2024-11-20 PROCEDURE — 82306 VITAMIN D 25 HYDROXY: CPT | Performed by: CLINICAL MEDICAL LABORATORY

## 2024-11-20 PROCEDURE — 85025 COMPLETE CBC W/AUTO DIFF WBC: CPT | Performed by: CLINICAL MEDICAL LABORATORY

## 2024-11-20 PROCEDURE — 80061 LIPID PANEL: CPT | Performed by: CLINICAL MEDICAL LABORATORY

## 2024-11-20 PROCEDURE — 36415 COLL VENOUS BLD VENIPUNCTURE: CPT | Performed by: INTERNAL MEDICINE

## 2024-11-20 PROCEDURE — 82746 ASSAY OF FOLIC ACID SERUM: CPT | Performed by: CLINICAL MEDICAL LABORATORY

## 2024-11-20 PROCEDURE — 84443 ASSAY THYROID STIM HORMONE: CPT | Performed by: CLINICAL MEDICAL LABORATORY

## 2024-11-20 PROCEDURE — 80053 COMPREHEN METABOLIC PANEL: CPT | Performed by: CLINICAL MEDICAL LABORATORY

## 2024-11-20 PROCEDURE — 82607 VITAMIN B-12: CPT | Performed by: CLINICAL MEDICAL LABORATORY

## 2024-11-20 PROCEDURE — 83735 ASSAY OF MAGNESIUM: CPT | Performed by: CLINICAL MEDICAL LABORATORY

## 2024-11-21 ENCOUNTER — APPOINTMENT (OUTPATIENT)
Dept: REHABILITATION | Age: 69
End: 2024-11-21

## 2024-11-21 DIAGNOSIS — M54.50 LUMBAR BACK PAIN: Primary | ICD-10-CM

## 2024-11-21 ASSESSMENT — ENCOUNTER SYMPTOMS: PAIN SEVERITY NOW: 4

## 2024-11-25 ENCOUNTER — TELEPHONE (OUTPATIENT)
Dept: REHABILITATION | Age: 69
End: 2024-11-25

## 2024-11-25 ENCOUNTER — APPOINTMENT (OUTPATIENT)
Dept: REHABILITATION | Age: 69
End: 2024-11-25

## 2024-11-25 ENCOUNTER — APPOINTMENT (OUTPATIENT)
Dept: INTERNAL MEDICINE | Age: 69
End: 2024-11-25

## 2024-11-27 ENCOUNTER — APPOINTMENT (OUTPATIENT)
Dept: REHABILITATION | Age: 69
End: 2024-11-27

## 2024-11-27 DIAGNOSIS — M54.50 LUMBAR BACK PAIN: Primary | ICD-10-CM

## 2024-12-02 ENCOUNTER — APPOINTMENT (OUTPATIENT)
Dept: REHABILITATION | Age: 69
End: 2024-12-02

## 2024-12-02 ENCOUNTER — APPOINTMENT (OUTPATIENT)
Dept: INTERNAL MEDICINE | Age: 69
End: 2024-12-02

## 2024-12-02 DIAGNOSIS — M54.50 LUMBAR BACK PAIN: Primary | ICD-10-CM

## 2024-12-03 DIAGNOSIS — I42.8 NONISCHEMIC CARDIOMYOPATHY  (CMD): ICD-10-CM

## 2024-12-03 DIAGNOSIS — I50.42 CHRONIC COMBINED SYSTOLIC AND DIASTOLIC HEART FAILURE  (CMD): ICD-10-CM

## 2024-12-05 ENCOUNTER — TELEPHONE (OUTPATIENT)
Dept: REHABILITATION | Age: 69
End: 2024-12-05

## 2024-12-05 ENCOUNTER — APPOINTMENT (OUTPATIENT)
Dept: REHABILITATION | Age: 69
End: 2024-12-05

## 2025-01-08 ENCOUNTER — ANCILLARY PROCEDURE (OUTPATIENT)
Dept: CARDIOLOGY | Age: 70
End: 2025-01-08
Attending: INTERNAL MEDICINE

## 2025-01-08 ENCOUNTER — ANCILLARY ORDERS (OUTPATIENT)
Dept: CARDIOLOGY | Age: 70
End: 2025-01-08

## 2025-01-08 DIAGNOSIS — Z45.02 ENCOUNTER FOR INTERROGATION OF CARDIAC DEFIBRILLATOR: Primary | ICD-10-CM

## 2025-01-08 DIAGNOSIS — Z45.02 ENCOUNTER FOR INTERROGATION OF CARDIAC DEFIBRILLATOR: ICD-10-CM

## 2025-01-08 LAB
MDC_IDC_LEAD_CONNECTION_STATUS: NORMAL
MDC_IDC_LEAD_CONNECTION_STATUS: NORMAL
MDC_IDC_LEAD_IMPLANT_DT: NORMAL
MDC_IDC_LEAD_IMPLANT_DT: NORMAL
MDC_IDC_LEAD_LOCATION: NORMAL
MDC_IDC_LEAD_LOCATION: NORMAL
MDC_IDC_LEAD_LOCATION_DETAIL_1: NORMAL
MDC_IDC_LEAD_LOCATION_DETAIL_1: NORMAL
MDC_IDC_LEAD_MFG: NORMAL
MDC_IDC_LEAD_MFG: NORMAL
MDC_IDC_LEAD_MODEL: NORMAL
MDC_IDC_LEAD_MODEL: NORMAL
MDC_IDC_LEAD_POLARITY_TYPE: NORMAL
MDC_IDC_LEAD_POLARITY_TYPE: NORMAL
MDC_IDC_LEAD_SERIAL: NORMAL
MDC_IDC_LEAD_SERIAL: NORMAL
MDC_IDC_PG_IMPLANT_DTM: NORMAL
MDC_IDC_PG_MFG: NORMAL
MDC_IDC_PG_MODEL: NORMAL
MDC_IDC_PG_SERIAL: NORMAL
MDC_IDC_PG_TYPE: NORMAL
MDC_IDC_SESS_CLINIC_NAME: NORMAL
MDC_IDC_SESS_TYPE: NORMAL

## 2025-01-08 PROCEDURE — 93295 DEV INTERROG REMOTE 1/2/MLT: CPT | Performed by: INTERNAL MEDICINE

## 2025-01-14 ENCOUNTER — APPOINTMENT (OUTPATIENT)
Dept: INTERNAL MEDICINE | Age: 70
End: 2025-01-14

## 2025-01-14 VITALS — RESPIRATION RATE: 18 BRPM | HEIGHT: 71 IN | BODY MASS INDEX: 27.72 KG/M2

## 2025-01-14 DIAGNOSIS — I50.42 CHRONIC COMBINED SYSTOLIC AND DIASTOLIC HEART FAILURE  (CMD): Chronic | ICD-10-CM

## 2025-01-14 DIAGNOSIS — M72.0 DUPUYTREN'S CONTRACTURE: ICD-10-CM

## 2025-01-14 DIAGNOSIS — I42.8 NONISCHEMIC CARDIOMYOPATHY  (CMD): ICD-10-CM

## 2025-01-14 DIAGNOSIS — F22 PARANOID BEHAVIOR  (CMD): Primary | ICD-10-CM

## 2025-01-14 DIAGNOSIS — I25.10 CAD IN NATIVE ARTERY: ICD-10-CM

## 2025-01-14 DIAGNOSIS — I47.20 VENTRICULAR TACHYCARDIA  (CMD): ICD-10-CM

## 2025-01-14 DIAGNOSIS — I10 PRIMARY HYPERTENSION: ICD-10-CM

## 2025-01-14 RX ORDER — LOSARTAN POTASSIUM 50 MG/1
50 TABLET ORAL DAILY
COMMUNITY
Start: 2024-11-05 | End: 2025-01-14 | Stop reason: ALTCHOICE

## 2025-01-14 RX ORDER — METOPROLOL SUCCINATE 25 MG/1
25 TABLET, EXTENDED RELEASE ORAL DAILY
Qty: 90 TABLET | Refills: 3 | Status: SHIPPED | OUTPATIENT
Start: 2025-01-14

## 2025-01-14 RX ORDER — EZETIMIBE 10 MG/1
10 TABLET ORAL DAILY
Qty: 90 TABLET | Refills: 3 | Status: SHIPPED | OUTPATIENT
Start: 2025-01-14

## 2025-01-14 RX ORDER — ASPIRIN 81 MG/1
81 TABLET, COATED ORAL DAILY
Qty: 90 TABLET | Refills: 3 | Status: SHIPPED | OUTPATIENT
Start: 2025-01-14

## 2025-01-14 RX ORDER — SPIRONOLACTONE 25 MG/1
25 TABLET ORAL DAILY
Qty: 90 TABLET | Refills: 3 | Status: SHIPPED | OUTPATIENT
Start: 2025-01-14

## 2025-01-14 ASSESSMENT — PATIENT HEALTH QUESTIONNAIRE - PHQ9
2. FEELING DOWN, DEPRESSED OR HOPELESS: NOT AT ALL
SUM OF ALL RESPONSES TO PHQ9 QUESTIONS 1 AND 2: 0
CLINICAL INTERPRETATION OF PHQ2 SCORE: NO FURTHER SCREENING NEEDED
1. LITTLE INTEREST OR PLEASURE IN DOING THINGS: NOT AT ALL
SUM OF ALL RESPONSES TO PHQ9 QUESTIONS 1 AND 2: 0

## 2025-01-19 ASSESSMENT — ENCOUNTER SYMPTOMS
SHORTNESS OF BREATH: 1
WEAKNESS: 0
PHOTOPHOBIA: 0
NAUSEA: 0
DIARRHEA: 0
FEVER: 0
RHINORRHEA: 0
POLYDIPSIA: 0
BLOOD IN STOOL: 0
NUMBNESS: 0
ABDOMINAL PAIN: 0
PSYCHIATRIC NEGATIVE: 1
CONSTIPATION: 0
CHILLS: 0
HEADACHES: 0
COUGH: 0
UNEXPECTED WEIGHT CHANGE: 0
VOMITING: 0
FATIGUE: 0
SORE THROAT: 0
ABDOMINAL DISTENTION: 0
ALLERGIC/IMMUNOLOGIC NEGATIVE: 1
HEMATOLOGIC/LYMPHATIC NEGATIVE: 1

## 2025-02-03 DIAGNOSIS — I50.42 CHRONIC COMBINED SYSTOLIC AND DIASTOLIC HEART FAILURE  (CMD): Primary | Chronic | ICD-10-CM

## 2025-02-04 ENCOUNTER — TELEPHONE (OUTPATIENT)
Dept: CHRONIC DISEASE MANAGEMENT | Age: 70
End: 2025-02-04

## 2025-03-04 ENCOUNTER — TELEPHONE (OUTPATIENT)
Dept: INTERNAL MEDICINE | Age: 70
End: 2025-03-04

## 2025-03-05 ENCOUNTER — APPOINTMENT (OUTPATIENT)
Dept: CARDIOLOGY | Age: 70
End: 2025-03-05

## 2025-07-07 ENCOUNTER — APPOINTMENT (OUTPATIENT)
Dept: INTERNAL MEDICINE | Age: 70
End: 2025-07-07

## 2025-09-29 ENCOUNTER — APPOINTMENT (OUTPATIENT)
Dept: CARDIOLOGY | Age: 70
End: 2025-09-29
Attending: INTERNAL MEDICINE

## 2025-09-29 ENCOUNTER — APPOINTMENT (OUTPATIENT)
Dept: CARDIOLOGY | Age: 70
End: 2025-09-29